# Patient Record
Sex: FEMALE | Race: WHITE | Employment: OTHER | URBAN - NONMETROPOLITAN AREA
[De-identification: names, ages, dates, MRNs, and addresses within clinical notes are randomized per-mention and may not be internally consistent; named-entity substitution may affect disease eponyms.]

---

## 2017-02-07 RX ORDER — IBRUTINIB 140 MG/1
TABLET, FILM COATED ORAL
Qty: 60 CAPSULE | Refills: 0 | Status: SHIPPED | OUTPATIENT
Start: 2017-02-07 | End: 2017-03-14 | Stop reason: SDUPTHER

## 2017-02-09 ENCOUNTER — OFFICE VISIT (OUTPATIENT)
Dept: ONCOLOGY | Age: 67
End: 2017-02-09

## 2017-02-09 VITALS
TEMPERATURE: 97.3 F | HEART RATE: 70 BPM | OXYGEN SATURATION: 97 % | BODY MASS INDEX: 34.58 KG/M2 | SYSTOLIC BLOOD PRESSURE: 119 MMHG | DIASTOLIC BLOOD PRESSURE: 57 MMHG | WEIGHT: 215.2 LBS | RESPIRATION RATE: 18 BRPM | HEIGHT: 66 IN

## 2017-02-09 DIAGNOSIS — C91.10 CLL (CHRONIC LYMPHOCYTIC LEUKEMIA) (HCC): Primary | ICD-10-CM

## 2017-02-09 PROCEDURE — 99215 OFFICE O/P EST HI 40 MIN: CPT | Performed by: INTERNAL MEDICINE

## 2017-02-13 ENCOUNTER — TELEPHONE (OUTPATIENT)
Dept: ONCOLOGY | Age: 67
End: 2017-02-13

## 2017-03-14 RX ORDER — IBRUTINIB 140 MG/1
TABLET, FILM COATED ORAL
Qty: 60 CAPSULE | Refills: 0 | Status: SHIPPED | OUTPATIENT
Start: 2017-03-14 | End: 2017-04-13 | Stop reason: SDUPTHER

## 2017-04-17 RX ORDER — IBRUTINIB 140 MG/1
TABLET, FILM COATED ORAL
Qty: 60 CAPSULE | Refills: 0 | Status: SHIPPED | OUTPATIENT
Start: 2017-04-17 | End: 2017-09-01 | Stop reason: ALTCHOICE

## 2017-08-01 ENCOUNTER — OFFICE VISIT (OUTPATIENT)
Dept: SURGERY | Age: 67
End: 2017-08-01
Payer: MEDICARE

## 2017-08-01 VITALS
HEIGHT: 66 IN | SYSTOLIC BLOOD PRESSURE: 120 MMHG | HEART RATE: 77 BPM | WEIGHT: 213.8 LBS | BODY MASS INDEX: 34.36 KG/M2 | TEMPERATURE: 96.6 F | DIASTOLIC BLOOD PRESSURE: 70 MMHG | OXYGEN SATURATION: 94 % | RESPIRATION RATE: 18 BRPM

## 2017-08-01 DIAGNOSIS — I10 ESSENTIAL HYPERTENSION: ICD-10-CM

## 2017-08-01 DIAGNOSIS — R59.1 LYMPHADENOPATHY: Primary | ICD-10-CM

## 2017-08-01 DIAGNOSIS — Z01.818 PRE-OP TESTING: ICD-10-CM

## 2017-08-01 PROCEDURE — 99203 OFFICE O/P NEW LOW 30 MIN: CPT | Performed by: SURGERY

## 2017-08-01 RX ORDER — LISINOPRIL AND HYDROCHLOROTHIAZIDE 25; 20 MG/1; MG/1
1 TABLET ORAL DAILY
COMMUNITY

## 2017-08-01 ASSESSMENT — ENCOUNTER SYMPTOMS
SORE THROAT: 0
EYE REDNESS: 0
PHOTOPHOBIA: 0
COLOR CHANGE: 0
BLOOD IN STOOL: 0
RHINORRHEA: 0
TROUBLE SWALLOWING: 0
WHEEZING: 0
RECTAL PAIN: 0
CHOKING: 0
VOMITING: 0
CHEST TIGHTNESS: 0
SHORTNESS OF BREATH: 0
EYE DISCHARGE: 0
BACK PAIN: 0
COUGH: 0
DIARRHEA: 0
ANAL BLEEDING: 0
ABDOMINAL PAIN: 0
CONSTIPATION: 0
EYE PAIN: 0
APNEA: 0
ABDOMINAL DISTENTION: 0
SINUS PRESSURE: 0
NAUSEA: 0
STRIDOR: 0
FACIAL SWELLING: 0
EYE ITCHING: 0
VOICE CHANGE: 0

## 2017-08-01 NOTE — PROGRESS NOTES
Subjective:      Patient ID: Wili Rene is a 69 y.o. female. Chief Complaint   Patient presents with    Surgical Consult     New patient-referred by Dr Pepe Ocampo posterior cervical lymph node biopsy       HPI 26-year-old white female who I seen in the past performing a lumpectomy for breast cancer also has been diagnosed with lymphoma as developed some recurring left supraclavicular and cervical lymphadenopathy she's been referred for possible excision for biopsy purposes to determine the level of treatment needed she states the nodules of got larger and are causing her increased discomfort she denies any infection or evidence of cellulitis    Review of Systems   Constitutional: Positive for fatigue. Negative for activity change, appetite change, chills, diaphoresis, fever and unexpected weight change. HENT: Negative for congestion, dental problem, drooling, ear discharge, ear pain, facial swelling, hearing loss, mouth sores, nosebleeds, postnasal drip, rhinorrhea, sinus pressure, sneezing, sore throat, tinnitus, trouble swallowing and voice change. Eyes: Negative for photophobia, pain, discharge, redness, itching and visual disturbance. Respiratory: Negative for apnea, cough, choking, chest tightness, shortness of breath, wheezing and stridor. Cardiovascular: Negative for chest pain, palpitations and leg swelling. Gastrointestinal: Negative for abdominal distention, abdominal pain, anal bleeding, blood in stool, constipation, diarrhea, nausea, rectal pain and vomiting. Endocrine: Negative for cold intolerance, heat intolerance, polydipsia, polyphagia and polyuria. Genitourinary: Negative for decreased urine volume, difficulty urinating, dyspareunia, dysuria, enuresis, flank pain, frequency, genital sores, hematuria, menstrual problem, pelvic pain, urgency, vaginal bleeding, vaginal discharge and vaginal pain.    Musculoskeletal: Negative for arthralgias, back pain, gait problem, joint on file     Social History Narrative     Family History   Problem Relation Age of Onset    Heart Disease Father      congestive heart failure     Allergies   Allergen Reactions    Zithromax [Azithromycin]      hives       Current Outpatient Prescriptions:     lisinopril-hydrochlorothiazide (PRINZIDE;ZESTORETIC) 20-25 MG per tablet, Take 1 tablet by mouth daily, Disp: , Rfl:     IMBRUVICA 140 MG chemo capsule, TAKE 2 CAPSULES BY MOUTH DAILY , Disp: 60 capsule, Rfl: 0    citalopram (CELEXA) 20 MG tablet, Take 20 mg by mouth daily. , Disp: , Rfl:     Vitamin D (CHOLECALCIFEROL) 1000 UNITS CAPS capsule, Take 1,000 Units by mouth daily Takes Vitamin D in the winter months, Disp: , Rfl:     Levothyroxine Sodium (SYNTHROID PO), Take 100 mcg by mouth daily , Disp: , Rfl:     pravastatin (PRAVACHOL) 40 MG tablet, Take 40 mg by mouth daily. , Disp: , Rfl:     Lansoprazole (PREVACID PO), Take  by mouth. OTC P.R.N. , Disp: , Rfl:     Objective:   Physical Exam   Constitutional: She is oriented to person, place, and time. She appears well-developed and well-nourished. HENT:   Head: Normocephalic and atraumatic. Eyes: Conjunctivae and EOM are normal. Pupils are equal, round, and reactive to light. Right eye exhibits no discharge. Left eye exhibits no discharge. No scleral icterus. Neck: Normal range of motion. Neck supple. No JVD present. No thyromegaly present. Cardiovascular: Normal rate and regular rhythm. Exam reveals no gallop and no friction rub. No murmur heard. Pulmonary/Chest: Effort normal and breath sounds normal. No stridor. No respiratory distress. She has no wheezes. She exhibits no tenderness. Abdominal: She exhibits no distension. There is no tenderness. There is no rebound. Musculoskeletal: Normal range of motion. Neurological: She is alert and oriented to person, place, and time. Skin: Skin is warm and dry. No rash noted. No erythema. No pallor.    Psychiatric: She has a normal mood and affect. Her behavior is normal. Judgment and thought content normal.       Assessment:      Cervical lymph nodes palpated in the anterior triangle patient has a history of breast cancer but these are unlikely related to those and has a history of lymphoma Dr. Nicholas Victoria would like to have excision of these lymph nodes for pathology purposes      Plan:      1. Schedule Briseyda Snyder for excision of left cervical lymph nodes  2. The risks, benefits and alternatives were discussed with Briseyda Snyder. She understands and wishes to proceed with surgical intervention. 3. Restrictions discussed with Moniquehardy Snyder and she expresses understanding. 4. She is advised to call back directly if there are further questions/concerns, or if her symptoms worsen prior to surgery.

## 2017-08-01 NOTE — MR AVS SNAPSHOT
After Visit Summary             Wili Rene   2017 12:00 PM   Office Visit    Description:  Female : 1950   Provider:  Harrietta Cheadle, MD   Department:  [facility]              Your Follow-Up and Future Appointments         Below is a list of your follow-up and future appointments. This may not be a complete list as you may have made appointments directly with providers that we are not aware of or your providers may have made some for you. Please call your providers to confirm appointments. It is important to keep your appointments. Please bring your current insurance card, photo ID, co-pay, and all medication bottles to your appointment. If self-pay, payment is expected at the time of service. Information from Your Visit        Department     Name Address Phone Fax    [facility] [address] [address] [address] [place] [phone]      You Were Seen for:         Comments    Lymphadenopathy   []         Vital Signs     Blood Pressure Pulse Temperature Respirations Height Weight    120/70 (Site: Left Arm, Position: Sitting, Cuff Size: Medium Adult) 77 96.6 °F (35.9 °C) (Tympanic) 18 5' 6.14\" (1.68 m) 213 lb 12.8 oz (97 kg)    Oxygen Saturation Body Mass Index Smoking Status             94% 34.36 kg/m2 Former Smoker         Additional Information about your Body Mass Index (BMI)           Your BMI as listed above is considered obese (30 or more). BMI is an estimate of body fat, calculated from your height and weight. The higher your BMI, the greater your risk of heart disease, high blood pressure, type 2 diabetes, stroke, gallstones, arthritis, sleep apnea, and certain cancers. BMI is not perfect. It may overestimate body fat in athletes and people who are more muscular.   Even a small weight loss (between 5 and 10 percent of your current weight) by decreasing your calorie intake and becoming more physically active will help lower your risk of developing or worsening diseases associated with obesity. Learn more at: [employer]             Medications and Orders      Your Current Medications Are              lisinopril-hydrochlorothiazide (PRINZIDE;ZESTORETIC) 20-25 MG per tablet Take 1 tablet by mouth daily    IMBRUVICA 140 MG chemo capsule TAKE 2 CAPSULES BY MOUTH DAILY     citalopram (CELEXA) 20 MG tablet Take 20 mg by mouth daily. Vitamin D (CHOLECALCIFEROL) 1000 UNITS CAPS capsule Take 1,000 Units by mouth daily Takes Vitamin D in the winter months    Levothyroxine Sodium (SYNTHROID PO) Take 100 mcg by mouth daily     pravastatin (PRAVACHOL) 40 MG tablet Take 40 mg by mouth daily. Lansoprazole (PREVACID PO) Take  by mouth. OTC P.R.N. Allergies              Zithromax [Azithromycin]     hives         Additional Information        Basic Information     Date Of Birth Sex Race Ethnicity Preferred Language    1950 Female White Non-/Non  English      Problem List as of 8/1/2017  Date Reviewed: 10/19/2015                Breast cancer, right breast [facility])    Chemotherapy management, encounter for    Leukocytosis    CLL (chronic lymphocytic leukemia) ([facility] [address]      Immunizations as of 8/1/2017     Name Date    Pneumococcal Polysaccharide (Agmonbbsx40) 11/14/2013      Preventive Care        Date Due    Hepatitis C screening is recommended for all adults regardless of risk factors born between [facility] at least once (lifetime) who have never been tested.  1950    Tetanus Combination Vaccine (1 - Tdap) 6/17/1967    Cholesterol Screening 6/17/1988    Diabetes Screening 6/17/1988    Colonoscopy 6/17/1998    Pneumococcal Vaccines (two) for age 72 years & over with: cerebrospinal fluid leaks, cochlear implants, hemoglobinopathies,  asplenia, immunodeficiencies, HIV infection, or chronic renal failure (2 of 2 -

## 2017-08-02 ENCOUNTER — TELEPHONE (OUTPATIENT)
Dept: SURGERY | Age: 67
End: 2017-08-02

## 2017-08-08 ENCOUNTER — TELEPHONE (OUTPATIENT)
Dept: SURGERY | Age: 67
End: 2017-08-08

## 2017-08-15 ENCOUNTER — ANESTHESIA (OUTPATIENT)
Dept: OPERATING ROOM | Age: 67
End: 2017-08-15
Payer: MEDICARE

## 2017-08-15 ENCOUNTER — ANESTHESIA EVENT (OUTPATIENT)
Dept: OPERATING ROOM | Age: 67
End: 2017-08-15
Payer: MEDICARE

## 2017-08-15 ENCOUNTER — HOSPITAL ENCOUNTER (OUTPATIENT)
Age: 67
Discharge: HOME | End: 2017-08-15
Payer: MEDICARE

## 2017-08-15 ENCOUNTER — HOSPITAL ENCOUNTER (OUTPATIENT)
Age: 67
Setting detail: OUTPATIENT SURGERY
Discharge: HOME | End: 2017-08-15
Attending: SURGERY | Admitting: SURGERY
Payer: MEDICARE

## 2017-08-15 VITALS
OXYGEN SATURATION: 100 % | RESPIRATION RATE: 4 BRPM | DIASTOLIC BLOOD PRESSURE: 64 MMHG | TEMPERATURE: 98.6 F | SYSTOLIC BLOOD PRESSURE: 114 MMHG

## 2017-08-15 VITALS
RESPIRATION RATE: 20 BRPM | TEMPERATURE: 97.6 F | HEART RATE: 70 BPM | WEIGHT: 211.2 LBS | OXYGEN SATURATION: 91 % | BODY MASS INDEX: 33.94 KG/M2 | SYSTOLIC BLOOD PRESSURE: 101 MMHG | HEIGHT: 66 IN | DIASTOLIC BLOOD PRESSURE: 61 MMHG

## 2017-08-15 LAB
ALBUMIN SERPL-MCNC: 3.7 G/DL (ref 3.5–5.1)
ALP BLD-CCNC: 93 U/L (ref 38–126)
ALT SERPL-CCNC: 30 U/L (ref 11–66)
ANION GAP SERPL CALCULATED.3IONS-SCNC: 12 MEQ/L (ref 8–16)
ANISOCYTOSIS: ABNORMAL
AST SERPL-CCNC: 23 U/L (ref 5–40)
BASOPHILS # BLD: 0.7 %
BASOPHILS ABSOLUTE: 0.1 THOU/MM3 (ref 0–0.1)
BILIRUB SERPL-MCNC: 0.7 MG/DL (ref 0.3–1.2)
BUN BLDV-MCNC: 9 MG/DL (ref 7–22)
CALCIUM SERPL-MCNC: 9.1 MG/DL (ref 8.5–10.5)
CHLORIDE BLD-SCNC: 101 MEQ/L (ref 98–111)
CO2: 28 MEQ/L (ref 23–33)
CREAT SERPL-MCNC: 0.8 MG/DL (ref 0.4–1.2)
EOSINOPHIL # BLD: 8.7 %
EOSINOPHILS ABSOLUTE: 1 THOU/MM3 (ref 0–0.4)
GFR SERPL CREATININE-BSD FRML MDRD: 71 ML/MIN/1.73M2
GLUCOSE BLD-MCNC: 114 MG/DL (ref 70–108)
HCT VFR BLD CALC: 38 % (ref 37–47)
HEMOGLOBIN: 12.2 GM/DL (ref 12–16)
HYPOCHROMIA: ABNORMAL
LD: 270 U/L (ref 100–190)
LYMPHOCYTES # BLD: 11.1 %
LYMPHOCYTES ABSOLUTE: 1.3 THOU/MM3 (ref 1–4.8)
MCH RBC QN AUTO: 25.4 PG (ref 27–31)
MCHC RBC AUTO-ENTMCNC: 32.2 GM/DL (ref 33–37)
MCV RBC AUTO: 78.8 FL (ref 81–99)
MICROCYTES: ABNORMAL
MONOCYTES # BLD: 8.2 %
MONOCYTES ABSOLUTE: 1 THOU/MM3 (ref 0.4–1.3)
NUCLEATED RED BLOOD CELLS: 0 /100 WBC
PDW BLD-RTO: 15.9 % (ref 11.5–14.5)
PLATELET # BLD: 184 THOU/MM3 (ref 130–400)
PMV BLD AUTO: 7.5 MCM (ref 7.4–10.4)
POTASSIUM SERPL-SCNC: 4.1 MEQ/L (ref 3.5–5.2)
POTASSIUM SERPL-SCNC: 4.3 MEQ/L (ref 3.5–5.2)
RBC # BLD: 4.83 MILL/MM3 (ref 4.2–5.4)
RBC # BLD: NORMAL 10*6/UL
SEG NEUTROPHILS: 71.3 %
SEGMENTED NEUTROPHILS ABSOLUTE COUNT: 8.3 THOU/MM3 (ref 1.8–7.7)
SODIUM BLD-SCNC: 141 MEQ/L (ref 135–145)
TOTAL PROTEIN: 6.1 G/DL (ref 6.1–8)
WBC # BLD: 11.6 THOU/MM3 (ref 4.8–10.8)

## 2017-08-15 PROCEDURE — 6360000002 HC RX W HCPCS: Performed by: ANESTHESIOLOGY

## 2017-08-15 PROCEDURE — 7100000000 HC PACU RECOVERY - FIRST 15 MIN: Performed by: SURGERY

## 2017-08-15 PROCEDURE — 88365 INSITU HYBRIDIZATION (FISH): CPT

## 2017-08-15 PROCEDURE — 88341 IMHCHEM/IMCYTCHM EA ADD ANTB: CPT

## 2017-08-15 PROCEDURE — 88305 TISSUE EXAM BY PATHOLOGIST: CPT

## 2017-08-15 PROCEDURE — 85025 COMPLETE CBC W/AUTO DIFF WBC: CPT

## 2017-08-15 PROCEDURE — 6360000002 HC RX W HCPCS: Performed by: NURSE ANESTHETIST, CERTIFIED REGISTERED

## 2017-08-15 PROCEDURE — 7100000010 HC PHASE II RECOVERY - FIRST 15 MIN: Performed by: SURGERY

## 2017-08-15 PROCEDURE — 84132 ASSAY OF SERUM POTASSIUM: CPT

## 2017-08-15 PROCEDURE — 88185 FLOWCYTOMETRY/TC ADD-ON: CPT

## 2017-08-15 PROCEDURE — 38500 BIOPSY/REMOVAL LYMPH NODES: CPT | Performed by: SURGERY

## 2017-08-15 PROCEDURE — 2780000010 HC IMPLANT OTHER: Performed by: SURGERY

## 2017-08-15 PROCEDURE — 88364 INSITU HYBRIDIZATION (FISH): CPT

## 2017-08-15 PROCEDURE — 7100000001 HC PACU RECOVERY - ADDTL 15 MIN: Performed by: SURGERY

## 2017-08-15 PROCEDURE — 88184 FLOWCYTOMETRY/ TC 1 MARKER: CPT

## 2017-08-15 PROCEDURE — 3700000000 HC ANESTHESIA ATTENDED CARE: Performed by: SURGERY

## 2017-08-15 PROCEDURE — 36415 COLL VENOUS BLD VENIPUNCTURE: CPT

## 2017-08-15 PROCEDURE — 83615 LACTATE (LD) (LDH) ENZYME: CPT

## 2017-08-15 PROCEDURE — 7100000011 HC PHASE II RECOVERY - ADDTL 15 MIN: Performed by: SURGERY

## 2017-08-15 PROCEDURE — 3600000012 HC SURGERY LEVEL 2 ADDTL 15MIN: Performed by: SURGERY

## 2017-08-15 PROCEDURE — 3700000001 HC ADD 15 MINUTES (ANESTHESIA): Performed by: SURGERY

## 2017-08-15 PROCEDURE — 2500000003 HC RX 250 WO HCPCS: Performed by: NURSE ANESTHETIST, CERTIFIED REGISTERED

## 2017-08-15 PROCEDURE — 2580000003 HC RX 258: Performed by: SURGERY

## 2017-08-15 PROCEDURE — 80053 COMPREHEN METABOLIC PANEL: CPT

## 2017-08-15 PROCEDURE — 88342 IMHCHEM/IMCYTCHM 1ST ANTB: CPT

## 2017-08-15 PROCEDURE — 3600000002 HC SURGERY LEVEL 2 BASE: Performed by: SURGERY

## 2017-08-15 PROCEDURE — 6360000002 HC RX W HCPCS: Performed by: SURGERY

## 2017-08-15 RX ORDER — MEPERIDINE HYDROCHLORIDE 25 MG/ML
12.5 INJECTION INTRAMUSCULAR; INTRAVENOUS; SUBCUTANEOUS EVERY 5 MIN PRN
Status: DISCONTINUED | OUTPATIENT
Start: 2017-08-15 | End: 2017-08-15 | Stop reason: HOSPADM

## 2017-08-15 RX ORDER — PROMETHAZINE HYDROCHLORIDE 25 MG/ML
12.5 INJECTION, SOLUTION INTRAMUSCULAR; INTRAVENOUS
Status: DISCONTINUED | OUTPATIENT
Start: 2017-08-15 | End: 2017-08-15 | Stop reason: HOSPADM

## 2017-08-15 RX ORDER — OXYCODONE HYDROCHLORIDE AND ACETAMINOPHEN 5; 325 MG/1; MG/1
1 TABLET ORAL EVERY 6 HOURS PRN
Status: DISCONTINUED | OUTPATIENT
Start: 2017-08-15 | End: 2017-08-15 | Stop reason: HOSPADM

## 2017-08-15 RX ORDER — DIPHENHYDRAMINE HYDROCHLORIDE 50 MG/ML
12.5 INJECTION INTRAMUSCULAR; INTRAVENOUS
Status: DISCONTINUED | OUTPATIENT
Start: 2017-08-15 | End: 2017-08-15 | Stop reason: HOSPADM

## 2017-08-15 RX ORDER — DEXAMETHASONE SODIUM PHOSPHATE 4 MG/ML
INJECTION, SOLUTION INTRA-ARTICULAR; INTRALESIONAL; INTRAMUSCULAR; INTRAVENOUS; SOFT TISSUE PRN
Status: DISCONTINUED | OUTPATIENT
Start: 2017-08-15 | End: 2017-08-15 | Stop reason: SDUPTHER

## 2017-08-15 RX ORDER — FENTANYL CITRATE 50 UG/ML
25 INJECTION, SOLUTION INTRAMUSCULAR; INTRAVENOUS EVERY 5 MIN PRN
Status: DISCONTINUED | OUTPATIENT
Start: 2017-08-15 | End: 2017-08-15 | Stop reason: HOSPADM

## 2017-08-15 RX ORDER — LABETALOL HYDROCHLORIDE 5 MG/ML
5 INJECTION, SOLUTION INTRAVENOUS EVERY 10 MIN PRN
Status: DISCONTINUED | OUTPATIENT
Start: 2017-08-15 | End: 2017-08-15 | Stop reason: HOSPADM

## 2017-08-15 RX ORDER — PROPOFOL 10 MG/ML
INJECTION, EMULSION INTRAVENOUS PRN
Status: DISCONTINUED | OUTPATIENT
Start: 2017-08-15 | End: 2017-08-15 | Stop reason: SDUPTHER

## 2017-08-15 RX ORDER — LIDOCAINE HYDROCHLORIDE 20 MG/ML
INJECTION, SOLUTION INFILTRATION; PERINEURAL PRN
Status: DISCONTINUED | OUTPATIENT
Start: 2017-08-15 | End: 2017-08-15 | Stop reason: SDUPTHER

## 2017-08-15 RX ORDER — OXYCODONE HYDROCHLORIDE AND ACETAMINOPHEN 5; 325 MG/1; MG/1
1 TABLET ORAL EVERY 6 HOURS PRN
Qty: 20 TABLET | Refills: 0 | Status: SHIPPED | OUTPATIENT
Start: 2017-08-15 | End: 2017-08-24

## 2017-08-15 RX ORDER — SODIUM CHLORIDE 9 MG/ML
INJECTION, SOLUTION INTRAVENOUS CONTINUOUS
Status: DISCONTINUED | OUTPATIENT
Start: 2017-08-15 | End: 2017-08-15 | Stop reason: HOSPADM

## 2017-08-15 RX ORDER — ONDANSETRON 2 MG/ML
INJECTION INTRAMUSCULAR; INTRAVENOUS PRN
Status: DISCONTINUED | OUTPATIENT
Start: 2017-08-15 | End: 2017-08-15 | Stop reason: SDUPTHER

## 2017-08-15 RX ORDER — FENTANYL CITRATE 50 UG/ML
INJECTION, SOLUTION INTRAMUSCULAR; INTRAVENOUS PRN
Status: DISCONTINUED | OUTPATIENT
Start: 2017-08-15 | End: 2017-08-15 | Stop reason: SDUPTHER

## 2017-08-15 RX ORDER — MIDAZOLAM HYDROCHLORIDE 1 MG/ML
INJECTION INTRAMUSCULAR; INTRAVENOUS PRN
Status: DISCONTINUED | OUTPATIENT
Start: 2017-08-15 | End: 2017-08-15 | Stop reason: SDUPTHER

## 2017-08-15 RX ORDER — FENTANYL CITRATE 50 UG/ML
50 INJECTION, SOLUTION INTRAMUSCULAR; INTRAVENOUS EVERY 5 MIN PRN
Status: DISCONTINUED | OUTPATIENT
Start: 2017-08-15 | End: 2017-08-15 | Stop reason: HOSPADM

## 2017-08-15 RX ORDER — GLYCOPYRROLATE 0.2 MG/ML
INJECTION INTRAMUSCULAR; INTRAVENOUS PRN
Status: DISCONTINUED | OUTPATIENT
Start: 2017-08-15 | End: 2017-08-15 | Stop reason: SDUPTHER

## 2017-08-15 RX ORDER — METOCLOPRAMIDE HYDROCHLORIDE 5 MG/ML
10 INJECTION INTRAMUSCULAR; INTRAVENOUS
Status: DISCONTINUED | OUTPATIENT
Start: 2017-08-15 | End: 2017-08-15 | Stop reason: HOSPADM

## 2017-08-15 RX ADMIN — FENTANYL CITRATE 25 MCG: 50 INJECTION INTRAMUSCULAR; INTRAVENOUS at 12:23

## 2017-08-15 RX ADMIN — FENTANYL CITRATE 50 MCG: 50 INJECTION INTRAMUSCULAR; INTRAVENOUS at 12:19

## 2017-08-15 RX ADMIN — PHENYLEPHRINE HYDROCHLORIDE 200 MCG: 10 INJECTION INTRAMUSCULAR; INTRAVENOUS; SUBCUTANEOUS at 12:51

## 2017-08-15 RX ADMIN — FENTANYL CITRATE 50 MCG: 50 INJECTION INTRAMUSCULAR; INTRAVENOUS at 12:34

## 2017-08-15 RX ADMIN — LIDOCAINE HYDROCHLORIDE 80 MG: 20 INJECTION, SOLUTION INFILTRATION; PERINEURAL at 12:18

## 2017-08-15 RX ADMIN — ONDANSETRON 4 MG: 2 INJECTION INTRAMUSCULAR; INTRAVENOUS at 12:58

## 2017-08-15 RX ADMIN — PHENYLEPHRINE HYDROCHLORIDE 200 MCG: 10 INJECTION INTRAMUSCULAR; INTRAVENOUS; SUBCUTANEOUS at 12:42

## 2017-08-15 RX ADMIN — PHENYLEPHRINE HYDROCHLORIDE 200 MCG: 10 INJECTION INTRAMUSCULAR; INTRAVENOUS; SUBCUTANEOUS at 12:21

## 2017-08-15 RX ADMIN — DEXAMETHASONE SODIUM PHOSPHATE 8 MG: 4 INJECTION, SOLUTION INTRAMUSCULAR; INTRAVENOUS at 12:19

## 2017-08-15 RX ADMIN — GLYCOPYRROLATE 0.1 MG: 0.2 INJECTION, SOLUTION INTRAMUSCULAR; INTRAVENOUS at 12:28

## 2017-08-15 RX ADMIN — FENTANYL CITRATE 25 MCG: 50 INJECTION INTRAMUSCULAR; INTRAVENOUS at 13:24

## 2017-08-15 RX ADMIN — PHENYLEPHRINE HYDROCHLORIDE 100 MCG: 10 INJECTION INTRAMUSCULAR; INTRAVENOUS; SUBCUTANEOUS at 12:35

## 2017-08-15 RX ADMIN — PROPOFOL 200 MG: 10 INJECTION, EMULSION INTRAVENOUS at 12:11

## 2017-08-15 RX ADMIN — MIDAZOLAM HYDROCHLORIDE 2 MG: 1 INJECTION INTRAMUSCULAR; INTRAVENOUS at 12:08

## 2017-08-15 RX ADMIN — FENTANYL CITRATE 25 MCG: 50 INJECTION INTRAMUSCULAR; INTRAVENOUS at 13:16

## 2017-08-15 RX ADMIN — SODIUM CHLORIDE: 9 INJECTION, SOLUTION INTRAVENOUS at 11:43

## 2017-08-15 RX ADMIN — CEFAZOLIN SODIUM 1 G: 1 INJECTION, SOLUTION INTRAVENOUS at 12:07

## 2017-08-15 RX ADMIN — PHENYLEPHRINE HYDROCHLORIDE 100 MCG: 10 INJECTION INTRAMUSCULAR; INTRAVENOUS; SUBCUTANEOUS at 12:28

## 2017-08-15 RX ADMIN — GLYCOPYRROLATE 0.1 MG: 0.2 INJECTION, SOLUTION INTRAMUSCULAR; INTRAVENOUS at 12:41

## 2017-08-15 ASSESSMENT — PULMONARY FUNCTION TESTS
PIF_VALUE: 3
PIF_VALUE: 4
PIF_VALUE: 3
PIF_VALUE: 4
PIF_VALUE: 3
PIF_VALUE: 3
PIF_VALUE: 2
PIF_VALUE: 3
PIF_VALUE: 1
PIF_VALUE: 4
PIF_VALUE: 13
PIF_VALUE: 2
PIF_VALUE: 3
PIF_VALUE: 3
PIF_VALUE: 4
PIF_VALUE: 17
PIF_VALUE: 3
PIF_VALUE: 4
PIF_VALUE: 3
PIF_VALUE: 22
PIF_VALUE: 4
PIF_VALUE: 3
PIF_VALUE: 0
PIF_VALUE: 4
PIF_VALUE: 3
PIF_VALUE: 4
PIF_VALUE: 0
PIF_VALUE: 4
PIF_VALUE: 3
PIF_VALUE: 0
PIF_VALUE: 14
PIF_VALUE: 4
PIF_VALUE: 2
PIF_VALUE: 1
PIF_VALUE: 4
PIF_VALUE: 18
PIF_VALUE: 3
PIF_VALUE: 3
PIF_VALUE: 0
PIF_VALUE: 3
PIF_VALUE: 3
PIF_VALUE: 4
PIF_VALUE: 3
PIF_VALUE: 4
PIF_VALUE: 3
PIF_VALUE: 4
PIF_VALUE: 3
PIF_VALUE: 9
PIF_VALUE: 3
PIF_VALUE: 1
PIF_VALUE: 4
PIF_VALUE: 3
PIF_VALUE: 3
PIF_VALUE: 4
PIF_VALUE: 3

## 2017-08-15 ASSESSMENT — PAIN DESCRIPTION - LOCATION: LOCATION: NECK

## 2017-08-15 ASSESSMENT — PAIN SCALES - GENERAL
PAINLEVEL_OUTOF10: 6
PAINLEVEL_OUTOF10: 6
PAINLEVEL_OUTOF10: 0
PAINLEVEL_OUTOF10: 5

## 2017-08-15 ASSESSMENT — PAIN - FUNCTIONAL ASSESSMENT: PAIN_FUNCTIONAL_ASSESSMENT: 0-10

## 2017-08-15 ASSESSMENT — PAIN DESCRIPTION - PAIN TYPE: TYPE: SURGICAL PAIN

## 2017-08-15 ASSESSMENT — PAIN DESCRIPTION - ORIENTATION: ORIENTATION: LEFT

## 2017-08-16 ENCOUNTER — TELEPHONE (OUTPATIENT)
Dept: SURGERY | Age: 67
End: 2017-08-16

## 2017-08-17 LAB — LEUKEMIA/LYMPHOMA PHENOTYPING MISC: NORMAL

## 2017-08-24 ENCOUNTER — OFFICE VISIT (OUTPATIENT)
Dept: SURGERY | Age: 67
End: 2017-08-24

## 2017-08-24 VITALS
TEMPERATURE: 97.3 F | DIASTOLIC BLOOD PRESSURE: 74 MMHG | WEIGHT: 211.3 LBS | SYSTOLIC BLOOD PRESSURE: 126 MMHG | RESPIRATION RATE: 16 BRPM | HEIGHT: 66 IN | OXYGEN SATURATION: 94 % | BODY MASS INDEX: 33.96 KG/M2 | HEART RATE: 74 BPM

## 2017-08-24 DIAGNOSIS — Z51.89 VISIT FOR WOUND CHECK: Primary | ICD-10-CM

## 2017-08-24 DIAGNOSIS — Z48.02 VISIT FOR SUTURE REMOVAL: ICD-10-CM

## 2017-08-24 PROCEDURE — 99024 POSTOP FOLLOW-UP VISIT: CPT | Performed by: NURSE PRACTITIONER

## 2017-08-24 ASSESSMENT — ENCOUNTER SYMPTOMS
COUGH: 0
CONSTIPATION: 0
COLOR CHANGE: 0
EYE ITCHING: 0
PHOTOPHOBIA: 0
WHEEZING: 0
CHOKING: 0
SHORTNESS OF BREATH: 0
BLOOD IN STOOL: 0
EYE DISCHARGE: 0
APNEA: 0
STRIDOR: 0
RECTAL PAIN: 0
VOMITING: 0
ABDOMINAL DISTENTION: 0
SINUS PRESSURE: 0
ANAL BLEEDING: 0
SORE THROAT: 0
ABDOMINAL PAIN: 0
RHINORRHEA: 0
VOICE CHANGE: 0
NAUSEA: 0
TROUBLE SWALLOWING: 0
DIARRHEA: 0
EYE REDNESS: 0
EYE PAIN: 0
CHEST TIGHTNESS: 0
FACIAL SWELLING: 0
BACK PAIN: 0

## 2017-08-31 ENCOUNTER — HOSPITAL ENCOUNTER (OUTPATIENT)
Dept: PET IMAGING | Age: 67
Discharge: HOME | End: 2017-08-31
Payer: MEDICARE

## 2017-08-31 ENCOUNTER — HOSPITAL ENCOUNTER (OUTPATIENT)
Dept: NON INVASIVE DIAGNOSTICS | Age: 67
Discharge: HOME | End: 2017-08-31
Payer: MEDICARE

## 2017-08-31 DIAGNOSIS — C50.911 MALIGNANT NEOPLASM OF RIGHT FEMALE BREAST, UNSPECIFIED SITE OF BREAST: ICD-10-CM

## 2017-08-31 DIAGNOSIS — C91.10 CHRONIC LYMPHOCYTIC LEUKEMIA B-CELL TYPE NOT HAVING ACHIEVED REMISSION (HCC): ICD-10-CM

## 2017-08-31 LAB
LV EF: 55 %
LVEF MODALITY: NORMAL

## 2017-08-31 PROCEDURE — 93306 TTE W/DOPPLER COMPLETE: CPT

## 2017-08-31 PROCEDURE — 78815 PET IMAGE W/CT SKULL-THIGH: CPT

## 2017-08-31 PROCEDURE — A9552 F18 FDG: HCPCS | Performed by: INTERNAL MEDICINE

## 2017-08-31 PROCEDURE — 3430000000 HC RX DIAGNOSTIC RADIOPHARMACEUTICAL: Performed by: INTERNAL MEDICINE

## 2017-08-31 RX ORDER — FLUDEOXYGLUCOSE F 18 200 MCI/ML
11.8 INJECTION, SOLUTION INTRAVENOUS
Status: COMPLETED | OUTPATIENT
Start: 2017-08-31 | End: 2017-08-31

## 2017-08-31 RX ADMIN — FLUDEOXYGLUCOSE F 18 11.8 MILLICURIE: 200 INJECTION, SOLUTION INTRAVENOUS at 10:27

## 2017-09-01 ENCOUNTER — HOSPITAL ENCOUNTER (OUTPATIENT)
Dept: INTERVENTIONAL RADIOLOGY/VASCULAR | Age: 67
Discharge: HOME | End: 2017-09-01
Payer: MEDICARE

## 2017-09-01 VITALS
TEMPERATURE: 97.9 F | SYSTOLIC BLOOD PRESSURE: 105 MMHG | DIASTOLIC BLOOD PRESSURE: 63 MMHG | OXYGEN SATURATION: 97 % | BODY MASS INDEX: 34.38 KG/M2 | RESPIRATION RATE: 20 BRPM | WEIGHT: 213 LBS | HEART RATE: 75 BPM

## 2017-09-01 LAB
HCT VFR BLD CALC: 34.3 % (ref 37–47)
HEMOGLOBIN: 11.3 GM/DL (ref 12–16)
MCH RBC QN AUTO: 25.7 PG (ref 27–31)
MCHC RBC AUTO-ENTMCNC: 33 GM/DL (ref 33–37)
MCV RBC AUTO: 77.8 FL (ref 81–99)
PDW BLD-RTO: 15.9 % (ref 11.5–14.5)
PLATELET # BLD: 237 THOU/MM3 (ref 130–400)
PMV BLD AUTO: 7.1 MCM (ref 7.4–10.4)
RBC # BLD: 4.41 MILL/MM3 (ref 4.2–5.4)
WBC # BLD: 11 THOU/MM3 (ref 4.8–10.8)

## 2017-09-01 PROCEDURE — 6370000000 HC RX 637 (ALT 250 FOR IP)

## 2017-09-01 PROCEDURE — 85027 COMPLETE CBC AUTOMATED: CPT

## 2017-09-01 PROCEDURE — C1788 PORT, INDWELLING, IMP: HCPCS

## 2017-09-01 PROCEDURE — 2580000003 HC RX 258: Performed by: RADIOLOGY

## 2017-09-01 PROCEDURE — 76937 US GUIDE VASCULAR ACCESS: CPT

## 2017-09-01 PROCEDURE — 2500000003 HC RX 250 WO HCPCS: Performed by: RADIOLOGY

## 2017-09-01 PROCEDURE — A6257 TRANSPARENT FILM <= 16 SQ IN: HCPCS

## 2017-09-01 PROCEDURE — 36561 INSERT TUNNELED CV CATH: CPT

## 2017-09-01 PROCEDURE — 2500000003 HC RX 250 WO HCPCS

## 2017-09-01 PROCEDURE — 36415 COLL VENOUS BLD VENIPUNCTURE: CPT

## 2017-09-01 PROCEDURE — A6258 TRANSPARENT FILM >16<=48 IN: HCPCS

## 2017-09-01 PROCEDURE — 6360000002 HC RX W HCPCS

## 2017-09-01 PROCEDURE — C1894 INTRO/SHEATH, NON-LASER: HCPCS

## 2017-09-01 PROCEDURE — 77001 FLUOROGUIDE FOR VEIN DEVICE: CPT

## 2017-09-01 RX ORDER — HEPARIN SODIUM (PORCINE) LOCK FLUSH IV SOLN 100 UNIT/ML 100 UNIT/ML
300 SOLUTION INTRAVENOUS ONCE
Status: COMPLETED | OUTPATIENT
Start: 2017-09-01 | End: 2017-09-01

## 2017-09-01 RX ORDER — FENTANYL CITRATE 50 UG/ML
50 INJECTION, SOLUTION INTRAMUSCULAR; INTRAVENOUS ONCE
Status: COMPLETED | OUTPATIENT
Start: 2017-09-01 | End: 2017-09-01

## 2017-09-01 RX ORDER — MIDAZOLAM HYDROCHLORIDE 1 MG/ML
1 INJECTION INTRAMUSCULAR; INTRAVENOUS ONCE
Status: COMPLETED | OUTPATIENT
Start: 2017-09-01 | End: 2017-09-01

## 2017-09-01 RX ORDER — SODIUM CHLORIDE 450 MG/100ML
INJECTION, SOLUTION INTRAVENOUS CONTINUOUS
Status: DISCONTINUED | OUTPATIENT
Start: 2017-09-01 | End: 2017-09-02 | Stop reason: HOSPADM

## 2017-09-01 RX ORDER — CLINDAMYCIN PHOSPHATE 600 MG/50ML
600 INJECTION INTRAVENOUS
Status: COMPLETED | OUTPATIENT
Start: 2017-09-01 | End: 2017-09-01

## 2017-09-01 RX ADMIN — SODIUM CHLORIDE: 4.5 INJECTION, SOLUTION INTRAVENOUS at 10:11

## 2017-09-01 RX ADMIN — CLINDAMYCIN PHOSPHATE 600 MG: 12 INJECTION, SOLUTION INTRAMUSCULAR; INTRAVENOUS at 10:12

## 2017-09-01 RX ADMIN — MIDAZOLAM HYDROCHLORIDE 1 MG: 1 INJECTION INTRAMUSCULAR; INTRAVENOUS at 11:06

## 2017-09-01 RX ADMIN — MIDAZOLAM HYDROCHLORIDE 1 MG: 1 INJECTION INTRAMUSCULAR; INTRAVENOUS at 11:11

## 2017-09-01 RX ADMIN — FENTANYL CITRATE 50 MCG: 50 INJECTION, SOLUTION INTRAMUSCULAR; INTRAVENOUS at 11:06

## 2017-09-01 RX ADMIN — SODIUM CHLORIDE 50000 UNITS: 0.9 IRRIGANT IRRIGATION at 11:27

## 2017-09-01 RX ADMIN — HEPARIN SODIUM (PORCINE) LOCK FLUSH IV SOLN 100 UNIT/ML 300 UNITS: 100 SOLUTION at 11:26

## 2017-09-01 RX ADMIN — FENTANYL CITRATE 50 MCG: 50 INJECTION, SOLUTION INTRAMUSCULAR; INTRAVENOUS at 11:11

## 2017-09-01 ASSESSMENT — PAIN - FUNCTIONAL ASSESSMENT: PAIN_FUNCTIONAL_ASSESSMENT: 0-10

## 2017-09-01 ASSESSMENT — PAIN SCALES - GENERAL
PAINLEVEL_OUTOF10: 0

## 2017-10-16 ENCOUNTER — HOSPITAL ENCOUNTER (OUTPATIENT)
Dept: CT IMAGING | Age: 67
Discharge: HOME | End: 2017-10-16
Payer: MEDICARE

## 2017-10-16 DIAGNOSIS — C85.11 B-CELL LYMPHOMA OF LYMPH NODES OF NECK, UNSPECIFIED B-CELL LYMPHOMA TYPE (HCC): ICD-10-CM

## 2017-10-16 PROCEDURE — 71260 CT THORAX DX C+: CPT

## 2017-10-16 PROCEDURE — 6360000002 HC RX W HCPCS

## 2017-10-16 PROCEDURE — 70491 CT SOFT TISSUE NECK W/DYE: CPT

## 2017-10-16 PROCEDURE — 74177 CT ABD & PELVIS W/CONTRAST: CPT

## 2017-10-16 PROCEDURE — 6360000004 HC RX CONTRAST MEDICATION: Performed by: INTERNAL MEDICINE

## 2017-10-16 RX ORDER — HEPARIN SODIUM (PORCINE) LOCK FLUSH IV SOLN 100 UNIT/ML 100 UNIT/ML
500 SOLUTION INTRAVENOUS ONCE
Status: COMPLETED | OUTPATIENT
Start: 2017-10-16 | End: 2017-10-16

## 2017-10-16 RX ADMIN — IOHEXOL 50 ML: 240 INJECTION, SOLUTION INTRATHECAL; INTRAVASCULAR; INTRAVENOUS; ORAL at 09:39

## 2017-10-16 RX ADMIN — HEPARIN SODIUM (PORCINE) LOCK FLUSH IV SOLN 100 UNIT/ML 500 UNITS: 100 SOLUTION at 09:56

## 2017-10-16 RX ADMIN — IOPAMIDOL 85 ML: 755 INJECTION, SOLUTION INTRAVENOUS at 09:38

## 2018-01-22 ENCOUNTER — HOSPITAL ENCOUNTER (OUTPATIENT)
Dept: PET IMAGING | Age: 68
Discharge: HOME | End: 2018-01-22
Payer: MEDICARE

## 2018-01-22 DIAGNOSIS — C50.911 MALIGNANT NEOPLASM OF RIGHT FEMALE BREAST, UNSPECIFIED ESTROGEN RECEPTOR STATUS, UNSPECIFIED SITE OF BREAST (HCC): ICD-10-CM

## 2018-01-22 DIAGNOSIS — C83.31 DIFFUSE LARGE B-CELL LYMPHOMA, LYMPH NODES OF HEAD, FACE, AND NECK (HCC): ICD-10-CM

## 2018-01-22 DIAGNOSIS — C91.10 CHRONIC LYMPHOCYTIC LEUKEMIA OF B-CELL TYPE NOT HAVING ACHIEVED REMISSION (HCC): ICD-10-CM

## 2018-01-22 PROCEDURE — 3430000000 HC RX DIAGNOSTIC RADIOPHARMACEUTICAL: Performed by: INTERNAL MEDICINE

## 2018-01-22 PROCEDURE — 78815 PET IMAGE W/CT SKULL-THIGH: CPT

## 2018-01-22 PROCEDURE — A9552 F18 FDG: HCPCS | Performed by: INTERNAL MEDICINE

## 2018-01-22 RX ORDER — FLUDEOXYGLUCOSE F 18 200 MCI/ML
10 INJECTION, SOLUTION INTRAVENOUS
Status: COMPLETED | OUTPATIENT
Start: 2018-01-22 | End: 2018-01-22

## 2018-01-22 RX ADMIN — FLUDEOXYGLUCOSE F 18 12.2 MILLICURIE: 200 INJECTION, SOLUTION INTRAVENOUS at 10:43

## 2018-01-29 ENCOUNTER — HOSPITAL ENCOUNTER (OUTPATIENT)
Dept: ULTRASOUND IMAGING | Age: 68
Discharge: HOME | End: 2018-01-29
Payer: MEDICARE

## 2018-01-29 DIAGNOSIS — C50.911 MALIGNANT NEOPLASM OF RIGHT FEMALE BREAST, UNSPECIFIED ESTROGEN RECEPTOR STATUS, UNSPECIFIED SITE OF BREAST (HCC): ICD-10-CM

## 2018-01-29 DIAGNOSIS — R11.2 NAUSEA AND VOMITING, INTRACTABILITY OF VOMITING NOT SPECIFIED, UNSPECIFIED VOMITING TYPE: ICD-10-CM

## 2018-01-29 DIAGNOSIS — C83.31 DIFFUSE LARGE B-CELL LYMPHOMA, LYMPH NODES OF HEAD, FACE, AND NECK (HCC): ICD-10-CM

## 2018-01-29 DIAGNOSIS — C91.10 CHRONIC LYMPHOCYTIC LEUK OF B-CELL TYPE NOT ACHIEVE REMIS (HCC): ICD-10-CM

## 2018-01-29 PROCEDURE — 76536 US EXAM OF HEAD AND NECK: CPT

## 2018-03-08 ENCOUNTER — HOSPITAL ENCOUNTER (OUTPATIENT)
Dept: NUCLEAR MEDICINE | Age: 68
Discharge: HOME | End: 2018-03-08
Payer: MEDICARE

## 2018-03-14 ENCOUNTER — APPOINTMENT (OUTPATIENT)
Dept: NUCLEAR MEDICINE | Age: 68
End: 2018-03-14
Payer: MEDICARE

## 2018-04-05 ENCOUNTER — HOSPITAL ENCOUNTER (OUTPATIENT)
Dept: NUCLEAR MEDICINE | Age: 68
Discharge: HOME | End: 2018-04-05
Payer: MEDICARE

## 2018-04-05 DIAGNOSIS — C50.011 MALIGNANT NEOPLASM OF NIPPLE OF RIGHT BREAST IN FEMALE, UNSPECIFIED ESTROGEN RECEPTOR STATUS (HCC): ICD-10-CM

## 2018-04-05 DIAGNOSIS — C91.10 CHRONIC LYMPHOCYTIC LEUKEMIA B-CELL TYPE NOT HAVING ACHIEVED REMISSION (HCC): ICD-10-CM

## 2018-04-05 DIAGNOSIS — C83.31 DIFFUSE LARGE B-CELL LYMPHOMA, LYMPH NODES OF HEAD, FACE, AND NECK (HCC): ICD-10-CM

## 2018-04-05 DIAGNOSIS — R93.89 ABNORMAL THYROID ULTRASOUND: ICD-10-CM

## 2018-04-05 DIAGNOSIS — R11.2 NAUSEA AND VOMITING, INTRACTABILITY OF VOMITING NOT SPECIFIED, UNSPECIFIED VOMITING TYPE: ICD-10-CM

## 2018-04-05 PROCEDURE — 3430000000 HC RX DIAGNOSTIC RADIOPHARMACEUTICAL: Performed by: INTERNAL MEDICINE

## 2018-04-05 PROCEDURE — A9516 IODINE I-123 SOD IODIDE MIC: HCPCS | Performed by: INTERNAL MEDICINE

## 2018-04-05 RX ADMIN — Medication 419 MICRO CURIE: at 08:20

## 2018-04-06 ENCOUNTER — HOSPITAL ENCOUNTER (OUTPATIENT)
Dept: NUCLEAR MEDICINE | Age: 68
Discharge: HOME | End: 2018-04-06
Payer: MEDICARE

## 2018-04-06 PROCEDURE — 78014 THYROID IMAGING W/BLOOD FLOW: CPT

## 2018-07-26 ENCOUNTER — HOSPITAL ENCOUNTER (OUTPATIENT)
Dept: CT IMAGING | Age: 68
Discharge: HOME | End: 2018-07-26
Payer: MEDICARE

## 2018-07-26 DIAGNOSIS — C91.10 CLL (CHRONIC LYMPHOCYTIC LEUKEMIA) (HCC): ICD-10-CM

## 2018-07-26 LAB — POC CREATININE WHOLE BLOOD: 0.7 MG/DL (ref 0.5–1.2)

## 2018-07-26 PROCEDURE — 6360000004 HC RX CONTRAST MEDICATION: Performed by: INTERNAL MEDICINE

## 2018-07-26 PROCEDURE — 74177 CT ABD & PELVIS W/CONTRAST: CPT

## 2018-07-26 PROCEDURE — 71260 CT THORAX DX C+: CPT

## 2018-07-26 PROCEDURE — 70491 CT SOFT TISSUE NECK W/DYE: CPT

## 2018-07-26 PROCEDURE — 82565 ASSAY OF CREATININE: CPT

## 2018-07-26 RX ADMIN — IOHEXOL 50 ML: 240 INJECTION, SOLUTION INTRATHECAL; INTRAVASCULAR; INTRAVENOUS; ORAL at 11:40

## 2018-07-26 RX ADMIN — IOPAMIDOL 85 ML: 755 INJECTION, SOLUTION INTRAVENOUS at 11:40

## 2019-01-28 ENCOUNTER — HOSPITAL ENCOUNTER (OUTPATIENT)
Dept: CT IMAGING | Age: 69
Discharge: HOME | End: 2019-01-28
Payer: MEDICARE

## 2019-01-28 DIAGNOSIS — C83.31 DIFFUSE LARGE B-CELL LYMPHOMA, LYMPH NODES OF HEAD, FACE, AND NECK (HCC): ICD-10-CM

## 2019-01-28 DIAGNOSIS — C91.10 CHRONIC LYMPHOCYTIC LEUKEMIA OF B-CELL TYPE NOT HAVING ACHIEVED REMISSION (HCC): ICD-10-CM

## 2019-01-28 PROCEDURE — 74177 CT ABD & PELVIS W/CONTRAST: CPT

## 2019-01-28 PROCEDURE — 71260 CT THORAX DX C+: CPT

## 2019-01-28 PROCEDURE — 6360000004 HC RX CONTRAST MEDICATION: Performed by: INTERNAL MEDICINE

## 2019-01-28 PROCEDURE — 70491 CT SOFT TISSUE NECK W/DYE: CPT

## 2019-01-28 RX ADMIN — IOHEXOL 50 ML: 240 INJECTION, SOLUTION INTRATHECAL; INTRAVASCULAR; INTRAVENOUS; ORAL at 09:48

## 2019-01-28 RX ADMIN — IOPAMIDOL 85 ML: 755 INJECTION, SOLUTION INTRAVENOUS at 09:48

## 2019-02-08 ENCOUNTER — HOSPITAL ENCOUNTER (OUTPATIENT)
Dept: WOMENS IMAGING | Age: 69
Discharge: HOME | End: 2019-02-08
Payer: MEDICARE

## 2019-02-08 DIAGNOSIS — Z78.0 POST-MENOPAUSAL: ICD-10-CM

## 2019-02-08 PROCEDURE — 77080 DXA BONE DENSITY AXIAL: CPT

## 2019-02-11 ENCOUNTER — HOSPITAL ENCOUNTER (OUTPATIENT)
Dept: PET IMAGING | Age: 69
Discharge: HOME | End: 2019-02-11
Payer: MEDICARE

## 2019-02-11 DIAGNOSIS — C50.911 MALIGNANT NEOPLASM OF RIGHT FEMALE BREAST, UNSPECIFIED ESTROGEN RECEPTOR STATUS, UNSPECIFIED SITE OF BREAST (HCC): ICD-10-CM

## 2019-02-11 DIAGNOSIS — C91.10 CHRONIC LYMPHOCYTIC LEUK OF B-CELL TYPE NOT ACHIEVE REMIS (HCC): ICD-10-CM

## 2019-02-11 DIAGNOSIS — C83.31 DIFFUSE LARGE B-CELL LYMPHOMA, LYMPH NODES OF HEAD, FACE, AND NECK (HCC): ICD-10-CM

## 2019-02-11 PROCEDURE — 78815 PET IMAGE W/CT SKULL-THIGH: CPT

## 2019-02-11 PROCEDURE — 3430000000 HC RX DIAGNOSTIC RADIOPHARMACEUTICAL: Performed by: INTERNAL MEDICINE

## 2019-02-11 PROCEDURE — A9552 F18 FDG: HCPCS | Performed by: INTERNAL MEDICINE

## 2019-02-11 RX ORDER — FLUDEOXYGLUCOSE F 18 200 MCI/ML
10 INJECTION, SOLUTION INTRAVENOUS
Status: COMPLETED | OUTPATIENT
Start: 2019-02-11 | End: 2019-02-11

## 2019-02-11 RX ADMIN — FLUDEOXYGLUCOSE F 18 13.7 MILLICURIE: 200 INJECTION, SOLUTION INTRAVENOUS at 10:17

## 2019-06-04 ENCOUNTER — HOSPITAL ENCOUNTER (OUTPATIENT)
Dept: CT IMAGING | Age: 69
Discharge: HOME | End: 2019-06-04
Payer: MEDICARE

## 2019-06-04 ENCOUNTER — HOSPITAL ENCOUNTER (OUTPATIENT)
Age: 69
Discharge: HOME | End: 2019-06-04
Payer: MEDICARE

## 2019-06-04 DIAGNOSIS — C91.10 ANEMIA ASSOC WITH CHRONIC LYMPHOCYTIC LEUKEMIA TXD WITH ERYTHROPOIETIN (HCC): ICD-10-CM

## 2019-06-04 DIAGNOSIS — D63.0 ANEMIA ASSOC WITH CHRONIC LYMPHOCYTIC LEUKEMIA TXD WITH ERYTHROPOIETIN (HCC): ICD-10-CM

## 2019-06-04 LAB
ALBUMIN SERPL-MCNC: 4.3 G/DL (ref 3.5–5.1)
ALP BLD-CCNC: 96 U/L (ref 38–126)
ALT SERPL-CCNC: 52 U/L (ref 11–66)
ANION GAP SERPL CALCULATED.3IONS-SCNC: 15 MEQ/L (ref 8–16)
AST SERPL-CCNC: 37 U/L (ref 5–40)
BASOPHILS # BLD: 0.3 %
BASOPHILS ABSOLUTE: 0 THOU/MM3 (ref 0–0.1)
BILIRUB SERPL-MCNC: 0.4 MG/DL (ref 0.3–1.2)
BUN BLDV-MCNC: 8 MG/DL (ref 7–22)
CALCIUM SERPL-MCNC: 9.3 MG/DL (ref 8.5–10.5)
CHLORIDE BLD-SCNC: 103 MEQ/L (ref 98–111)
CO2: 25 MEQ/L (ref 23–33)
CREAT SERPL-MCNC: 0.6 MG/DL (ref 0.4–1.2)
EOSINOPHIL # BLD: 5 %
EOSINOPHILS ABSOLUTE: 0.3 THOU/MM3 (ref 0–0.4)
ERYTHROCYTE [DISTWIDTH] IN BLOOD BY AUTOMATED COUNT: 14.8 % (ref 11.5–14.5)
ERYTHROCYTE [DISTWIDTH] IN BLOOD BY AUTOMATED COUNT: 45 FL (ref 35–45)
GFR SERPL CREATININE-BSD FRML MDRD: > 90 ML/MIN/1.73M2
GLUCOSE BLD-MCNC: 142 MG/DL (ref 70–108)
HCT VFR BLD CALC: 39.8 % (ref 37–47)
HEMOGLOBIN: 13.5 GM/DL (ref 12–16)
IMMATURE GRANS (ABS): 0.04 THOU/MM3 (ref 0–0.07)
IMMATURE GRANULOCYTES: 0.6 %
LD: 207 U/L (ref 100–190)
LYMPHOCYTES # BLD: 16.2 %
LYMPHOCYTES ABSOLUTE: 1.1 THOU/MM3 (ref 1–4.8)
MCH RBC QN AUTO: 28.5 PG (ref 26–33)
MCHC RBC AUTO-ENTMCNC: 33.9 GM/DL (ref 32.2–35.5)
MCV RBC AUTO: 84.1 FL (ref 81–99)
MONOCYTES # BLD: 10 %
MONOCYTES ABSOLUTE: 0.7 THOU/MM3 (ref 0.4–1.3)
NUCLEATED RED BLOOD CELLS: 0 /100 WBC
PLATELET # BLD: 181 THOU/MM3 (ref 130–400)
PMV BLD AUTO: 9 FL (ref 9.4–12.4)
POC CREATININE WHOLE BLOOD: 0.6 MG/DL (ref 0.5–1.2)
POTASSIUM SERPL-SCNC: 3.6 MEQ/L (ref 3.5–5.2)
RBC # BLD: 4.73 MILL/MM3 (ref 4.2–5.4)
SEDIMENTATION RATE, ERYTHROCYTE: 8 MM/HR (ref 0–20)
SEG NEUTROPHILS: 67.9 %
SEGMENTED NEUTROPHILS ABSOLUTE COUNT: 4.6 THOU/MM3 (ref 1.8–7.7)
SODIUM BLD-SCNC: 143 MEQ/L (ref 135–145)
TOTAL PROTEIN: 6.5 G/DL (ref 6.1–8)
URIC ACID: 4.5 MG/DL (ref 2.4–5.7)
WBC # BLD: 6.8 THOU/MM3 (ref 4.8–10.8)

## 2019-06-04 PROCEDURE — 85025 COMPLETE CBC W/AUTO DIFF WBC: CPT

## 2019-06-04 PROCEDURE — 74177 CT ABD & PELVIS W/CONTRAST: CPT

## 2019-06-04 PROCEDURE — 80053 COMPREHEN METABOLIC PANEL: CPT

## 2019-06-04 PROCEDURE — 85651 RBC SED RATE NONAUTOMATED: CPT

## 2019-06-04 PROCEDURE — 83615 LACTATE (LD) (LDH) ENZYME: CPT

## 2019-06-04 PROCEDURE — 84550 ASSAY OF BLOOD/URIC ACID: CPT

## 2019-06-04 PROCEDURE — 36415 COLL VENOUS BLD VENIPUNCTURE: CPT

## 2019-06-04 PROCEDURE — 6360000004 HC RX CONTRAST MEDICATION: Performed by: INTERNAL MEDICINE

## 2019-06-04 PROCEDURE — 82232 ASSAY OF BETA-2 PROTEIN: CPT

## 2019-06-04 PROCEDURE — 82565 ASSAY OF CREATININE: CPT

## 2019-06-04 RX ADMIN — IOPAMIDOL 80 ML: 755 INJECTION, SOLUTION INTRAVENOUS at 11:11

## 2019-06-04 RX ADMIN — IOHEXOL 50 ML: 240 INJECTION, SOLUTION INTRATHECAL; INTRAVASCULAR; INTRAVENOUS; ORAL at 11:12

## 2019-06-06 LAB — BETA-2 MICROGLOBULIN: 2.6 MG/L (ref 1.1–2.4)

## 2021-05-26 ENCOUNTER — HOSPITAL ENCOUNTER (OUTPATIENT)
Dept: WOMENS IMAGING | Age: 71
Discharge: HOME | End: 2021-05-26
Payer: MEDICARE

## 2021-05-26 DIAGNOSIS — Z78.0 POSTMENOPAUSAL: ICD-10-CM

## 2021-05-26 PROCEDURE — 77080 DXA BONE DENSITY AXIAL: CPT

## 2022-01-25 ENCOUNTER — HOSPITAL ENCOUNTER (OUTPATIENT)
Age: 72
Discharge: HOME | End: 2022-01-25
Payer: MEDICARE

## 2022-01-25 PROCEDURE — U0003 INFECTIOUS AGENT DETECTION BY NUCLEIC ACID (DNA OR RNA); SEVERE ACUTE RESPIRATORY SYNDROME CORONAVIRUS 2 (SARS-COV-2) (CORONAVIRUS DISEASE [COVID-19]), AMPLIFIED PROBE TECHNIQUE, MAKING USE OF HIGH THROUGHPUT TECHNOLOGIES AS DESCRIBED BY CMS-2020-01-R: HCPCS

## 2022-01-25 PROCEDURE — U0005 INFEC AGEN DETEC AMPLI PROBE: HCPCS

## 2022-01-26 LAB
SARS-COV-2: DETECTED
SOURCE: ABNORMAL

## 2022-09-13 ENCOUNTER — HOSPITAL ENCOUNTER (OUTPATIENT)
Dept: INTERVENTIONAL RADIOLOGY/VASCULAR | Age: 72
Discharge: HOME | End: 2022-09-13
Payer: MEDICARE

## 2022-09-13 VITALS
DIASTOLIC BLOOD PRESSURE: 68 MMHG | WEIGHT: 200 LBS | SYSTOLIC BLOOD PRESSURE: 128 MMHG | OXYGEN SATURATION: 95 % | TEMPERATURE: 98.1 F | HEART RATE: 72 BPM | BODY MASS INDEX: 32.28 KG/M2 | RESPIRATION RATE: 18 BRPM

## 2022-09-13 DIAGNOSIS — C91.10 CLL (CHRONIC LYMPHOCYTIC LEUKEMIA) (HCC): ICD-10-CM

## 2022-09-13 PROCEDURE — 2580000003 HC RX 258: Performed by: RADIOLOGY

## 2022-09-13 PROCEDURE — 2709999900 IR FLUORO GUIDED CVA DEVICE PLMT/REPLACE/REMOVAL

## 2022-09-13 PROCEDURE — 6360000002 HC RX W HCPCS: Performed by: RADIOLOGY

## 2022-09-13 PROCEDURE — 36590 REMOVAL TUNNELED CV CATH: CPT

## 2022-09-13 PROCEDURE — 99153 MOD SED SAME PHYS/QHP EA: CPT

## 2022-09-13 PROCEDURE — 77001 FLUOROGUIDE FOR VEIN DEVICE: CPT

## 2022-09-13 PROCEDURE — 99152 MOD SED SAME PHYS/QHP 5/>YRS: CPT

## 2022-09-13 RX ORDER — MIDAZOLAM HYDROCHLORIDE 1 MG/ML
INJECTION INTRAMUSCULAR; INTRAVENOUS
Status: COMPLETED | OUTPATIENT
Start: 2022-09-13 | End: 2022-09-13

## 2022-09-13 RX ORDER — SODIUM CHLORIDE 450 MG/100ML
INJECTION, SOLUTION INTRAVENOUS CONTINUOUS
Status: DISCONTINUED | OUTPATIENT
Start: 2022-09-13 | End: 2022-09-14 | Stop reason: HOSPADM

## 2022-09-13 RX ORDER — FENTANYL CITRATE 50 UG/ML
50 INJECTION, SOLUTION INTRAMUSCULAR; INTRAVENOUS ONCE
Status: COMPLETED | OUTPATIENT
Start: 2022-09-13 | End: 2022-09-13

## 2022-09-13 RX ORDER — FENTANYL CITRATE 50 UG/ML
INJECTION, SOLUTION INTRAMUSCULAR; INTRAVENOUS
Status: COMPLETED | OUTPATIENT
Start: 2022-09-13 | End: 2022-09-13

## 2022-09-13 RX ORDER — MIDAZOLAM HYDROCHLORIDE 1 MG/ML
1 INJECTION INTRAMUSCULAR; INTRAVENOUS ONCE
Status: COMPLETED | OUTPATIENT
Start: 2022-09-13 | End: 2022-09-13

## 2022-09-13 RX ADMIN — FENTANYL CITRATE 50 MCG: 50 INJECTION, SOLUTION INTRAMUSCULAR; INTRAVENOUS at 08:21

## 2022-09-13 RX ADMIN — SODIUM CHLORIDE: 4.5 INJECTION, SOLUTION INTRAVENOUS at 07:04

## 2022-09-13 RX ADMIN — FENTANYL CITRATE 50 MCG: 50 INJECTION, SOLUTION INTRAMUSCULAR; INTRAVENOUS at 08:11

## 2022-09-13 RX ADMIN — MIDAZOLAM HYDROCHLORIDE 1 MG: 1 INJECTION, SOLUTION INTRAMUSCULAR; INTRAVENOUS at 08:10

## 2022-09-13 RX ADMIN — CEFAZOLIN 2000 MG: 10 INJECTION, POWDER, FOR SOLUTION INTRAVENOUS at 07:05

## 2022-09-13 RX ADMIN — MIDAZOLAM 1 MG: 1 INJECTION INTRAMUSCULAR; INTRAVENOUS at 08:21

## 2022-09-13 ASSESSMENT — PAIN SCALES - GENERAL
PAINLEVEL_OUTOF10: 0

## 2022-09-13 NOTE — DISCHARGE INSTRUCTIONS
INSERTION/REMOVAL OF CAPD/MEDIPORT/POWER PORT DISCHARGE INSTRUCTION SHEET    Diet: As before  Care of catheter site:  Keep dressing clean and dry  Ice to catheter site for next 4 hours (20 minutes every hour)  Limit activity to surgical site (no pushing, pulling, or lifting) for next 2 days  Sponge bath only for next 5 days - then may shower, remove dressing and leave open to air. Steri strips will fall off on their own - do not remove  No driving today    Return to nearest Emergency Room if any of the following:  Symptoms of bleeding, drainage, swelling  Increase in pain  Elevated temperature over 101 degrees    If you have any problems call your doctor or return to the nearest Emergency Room. SEDATION/ANALGESIA INFORMATION HOME GOING ADVICE    Review the following information with the patient prior to the procedure. Sedation/agalgesia is used during short medical procedures under controlled supervision. The medication will produce a strong relaxation. You will be able to hear, speak and follow instructions, but you memory and alertness will be decreased. You will be able to swallow and breathe on your own. During sedation/analgesia you blood pressure, hear and breathing will be watched closely. After the procedure, you may not remember what was said or done. Procedure:      Date:  You may have the following effects from the medication. Drowsiness, dizziness, sleepiness or confusion. Difficulty remembering or delayed reaction times. Loss of fine muscle control or difficulty with your balance especially while walking. Difficulty focusing or blurred vision. You may not be aware of slight changes in your behavior and/or your reaction time because of the medication used during the procedure. Therefore you should follow these instructions. Have someone responsible help you with your care. Do not drive for 24 hours.   Do not operate equipment for 24 hours ([employer], power tools, kitchen accessories, stove). Do not drink any alcoholic beverages for a minimum of 24 hours. Do not make important personal, legal or business decisions for 24 hours. You may experience dizziness or lightheadedness. Move slowly and carefully, do not make sudden position changes. Drink extra amounts of fluids today. Increase your diet as tolerated (unless you have received specific instructions from your doctor). If you feel nauseated, continue with liquids until nausea is gone  Notify your physician if you have not urinated within 8 hours after the procedure. Resume your medications unless otherwise instructed. contact your physician if you have any questions or concerns. I have been informed and understand how to care for myself/the patient at home. i know who to call if Jitendra Ware question or concerns. The adult responsible for my discharge care is:       You have received the sedation/analgesia medications/s:      IF YOU REPORT TO AN EMERGENCY ROOM, DOCTOR'S OFFICE OR HOSPITAL WITHIN 24 HRS AFTER PROCEDURE, BRING THIS SHEET WITH YOU AND GIVE IT THE PHYSICIAN OR NURSE ATTENDING YOU.

## 2022-09-13 NOTE — PROGRESS NOTES
[facility] ambulated into room with  TIA for mediport removal. Pt rights and responsibilities offered to pt. Procedure explained and questions answered. No labs were ordered for procedure. Iv started and atb hung for Bledsoe. She has the call light within reach and no other needs at this time. She has been NPO for over 4 hours. She does not take blood thinners. [address] 100 was picked up for procedure    0900  yuly is doing well. Her dressing is clean dry intact she states no pain, no nausea. She is resting comfortably she states. Ice pack on dressing. She has the call light with no needs at this time. She was offered drink and snack. 0915  ice pack on dressing, dressing CDI, no issues at this time. Call light within reach. 0930 no pain, no nausea. Bledsoe resting with eyes closed. , dressing unchanged. 1000 ice pack sent with Bledsoe dressing clean dry intact, no pain. Yuly awake and talking, alert and oriented. She has no other needs today at this time. She was wheeled out via wheelchair for d/c. D/c instructions explained to her and Kiah Scott and they verbalized understanding.        _m___ Safety:       (Environmental)  [place] to environment  Ensure ID band is correct and in place/ allergy band as needed  Assess for fall risk  Initiate fall precautions as applicable (fall band, side rails, etc.)  Call light within reach  Bed in low position/ wheels locked    __m__ Pain:       Assess pain level and characteristics  Administer analgesics as ordered  Assess effectiveness of pain management and report to MD as needed    _m___ Knowledge Deficit:  Assess baseline knowledge  Provide teaching at level of understanding  Provide teaching via preferred learning method  Evaluate teaching effectiveness    _m___ Hemodynamic/Respiratory Status:       (Pre and Post Procedure Monitoring)  Assess/Monitor vital signs and LOC  Assess Baseline SpO2 prior to any sedation  Obtain weight/height  Assess vital signs/ LOC until patient meets discharge criteria  Monitor procedure site and notify MD of any issues

## 2022-09-13 NOTE — H&P
[address] [address]  Sedation/Analgesia History & Physical    Pt Name: Chin Webster  MRN: [id number]  YOB: 1950  Provider Performing Procedure: Igor Springer MD, MD  Primary Care Physician: Deepak Cotton,     PRE-PROCEDURE   DNR-CCA/DNR-CC []Yes [x]No  Brief History/Pre-Procedure Diagnosis: chemotherapy finished          MEDICAL HISTORY  []CAD/Valve  []Liver Disease  []Lung Disease []Diabetes  []Hypertension []Renal Disease  []Additional information:       has a past medical history of Cancer ([facility] [address] [facility] Depression, GERD (gastroesophageal reflux disease), Hyperlipidemia, Hypertension, and Hypothyroidism. SURGICAL HISTORY   has a past surgical history that includes Carpal tunnel release; Total abdominal hysterectomy w/ bilateral salpingoophorectomy (1985); Breast lumpectomy (Right, ?2002); other surgical history (2007?); Hysterectomy (1975); pr needle biopsy, lymph node(s) (Left, 8/15/2017); and bone marrow biopsy (08/2017). Additional information:       ALLERGIES   Allergies as of 09/13/2022 - Fully Reviewed 09/13/2022   Allergen Reaction Noted    Zithromax [azithromycin]  11/02/2012     Additional information:       MEDICATIONS   Coumadin Use Last 5 Days [x]No []Yes  Antiplatelet drug therapy use last 5 days  [x]No []Yes  Other anticoagulant use last 5 days  [x]No []Yes    Current Outpatient Medications:     lisinopril-hydrochlorothiazide (PRINZIDE;ZESTORETIC) 20-25 MG per tablet, Take 1 tablet by mouth daily, Disp: , Rfl:     citalopram (CELEXA) 20 MG tablet, Take 20 mg by mouth daily. , Disp: , Rfl:     Vitamin D (CHOLECALCIFEROL) 1000 UNITS CAPS capsule, Take 1,000 Units by mouth daily Takes Vitamin D in the winter months, Disp: , Rfl:     Levothyroxine Sodium (SYNTHROID PO), Take 100 mcg by mouth daily , Disp: , Rfl:     pravastatin (PRAVACHOL) 40 MG tablet, Take 40 mg by mouth daily.   , Disp: , Rfl:     Lansoprazole (PREVACID PO), Take by mouth. OTC P.R.N. , Disp: , Rfl:     Current Facility-Administered Medications:     0.45 % sodium chloride infusion, , IntraVENous, Continuous, Laurann Severe, MD, Last Rate: 20 mL/hr at 09/13/22 0704, New Bag at 09/13/22 0704    ceFAZolin (ANCEF) 2000 mg in dextrose 5 % 50 mL IVPB, 2,000 mg, IntraVENous, 60 Min Pre-Op, Laurann Severe, MD, Last Rate: 100 mL/hr at 09/13/22 0705, 2,000 mg at 09/13/22 4020  Prior to Admission medications    Medication Sig Start Date End Date Taking? Authorizing Provider   lisinopril-hydrochlorothiazide (PRINZIDE;ZESTORETIC) 20-25 MG per tablet Take 1 tablet by mouth daily    Historical Provider, MD   citalopram (CELEXA) 20 MG tablet Take 20 mg by mouth daily. Historical Provider, MD   Vitamin D (CHOLECALCIFEROL) 1000 UNITS CAPS capsule Take 1,000 Units by mouth daily Takes Vitamin D in the winter months    Historical Provider, MD   Levothyroxine Sodium (SYNTHROID PO) Take 100 mcg by mouth daily     Historical Provider, MD   pravastatin (PRAVACHOL) 40 MG tablet Take 40 mg by mouth daily. Historical Provider, MD   Lansoprazole (PREVACID PO) Take  by mouth. OTC P.R.N.      Historical Provider, MD     Additional information:       VITAL SIGNS   Vitals:    09/13/22 0830   BP: 111/67   Pulse: 66   Resp: 15   Temp:    SpO2: 90%       PHYSICAL:   Heart:  [x]Regular rate and rhythm  []Other:    Lungs:  [x]Clear    []Other:    Abdomen: [x]Soft    []Other:    Mental Status: [x]Alert & Oriented  []Other:      PLANNED PROCEDURE   []Biospy []Arteriogram              []Drainage   [x]Mediport removal   []Fistulogram []IV access       []Vertebroplasty / Augmentation  []IVC filter []Dialysis catheter []Biliary drainage  []Other: []CAPD Catheter []Nephrostomy Tube / Stent  SEDATION  Planned agent:[x]Midazolam []Meperidine [x]Sublimaze []Dilaudid []Morphine     []Diazepam  []Other:     ASA Classification:  []1 [x]2 []3 []4 []5  Class 1: A normal healthy patient  Class 2: Pt with mild to moderate systemic disease  Class 3: Severe systemic disease or disturbance  Class 4: Severe systemic disorders that are already life threatening. Class 5: Moribund pt with little chances of survival, for more than 24 hours. Mallampati I Airway Classification:   []1 [x]2 []3 []4    [x]Pre-procedure diagnostic studies complete and results available. Comment:    [x]Previous sedation/anesthesia experiences assessed. Comment:    [x]The patient is an appropriate candidate to undergo the planned procedure sedation and anesthesia. (Refer to nursing sedation/analgesia documentation record)  [x]Formulation and discussion of sedation/procedure plan, risks, and expectations with patient and/or responsible adult completed. [x]Patient examined immediately prior to the procedure.  (Refer to nursing sedation/analgesia documentation record)    Rebecca Gutierrez MD, MD  Electronically signed 9/13/2022 at 8:34 AM

## 2022-09-13 NOTE — PROGRESS NOTES
0845 pt back from specials. Vitals stable. Pt denies pain. Site on right upper chest clean, dry and intact. Ice pack applied.

## 2022-09-13 NOTE — OP NOTE
Department of Radiology  Post Procedure Progress Note      Pre-Procedure Diagnosis:  chemotherapy finished    Procedure Performed:  mediport removal     Anesthesia: local / versed and fentanyl    Findings: successful    Immediate Complications:  None    Estimated Blood Loss: minimal    SEE DICTATED PROCEDURE NOTE FOR COMPLETE DETAILS.     Starr Salazar MD   9/13/2022 8:35 AM

## 2022-09-13 NOTE — H&P
Formulation and discussion of sedation / procedure plans, risks, benefits, side effects and alternatives with patient and/or responsible adult completed.     Electronically signed by Richard Amaral MD on 9/13/22 at 8:33 AM EDT

## 2022-09-13 NOTE — PROGRESS NOTES
0800 Patient received in IR for medi-port removal.  0800 This procedure has been fully reviewed with the patient and written informed consent has been obtained. 7949 Moved to table and attached to monitor. 6214 Dr. Roxane Benson in; spoke to patient and assessment obtained. 2832 Patient prepped for procedure. [id number] Procedure started with Dr. Roxane Benson. 0825 Port removed per Dr Roxane Benson. Pt tolerating procedure well.  0826 Incision right chest approximated with suture. 0830 Dermabond with steri-strips applied to incision. 0830 4 x 4 with op-site applied to incision. Site without redness, swelling or hematoma. 5641 Patient on bed; comfort ensured. Right  chest dressing remains dry and intact with area soft. Ice pack to site. 3523 Report called to HOSP PEDRO PABLO STODDARD RN. 3455 Patient taken to OPN. Offers no complaints.

## 2022-12-15 ENCOUNTER — HOSPITAL ENCOUNTER (OUTPATIENT)
Dept: CT IMAGING | Age: 72
Discharge: HOME | End: 2022-12-15

## 2022-12-15 DIAGNOSIS — Z00.6 EXAMINATION FOR NORMAL COMPARISON FOR CLINICAL RESEARCH: ICD-10-CM

## 2022-12-16 ENCOUNTER — HOSPITAL ENCOUNTER (OUTPATIENT)
Age: 72
Discharge: HOME | End: 2022-12-16
Payer: MEDICARE

## 2022-12-16 ENCOUNTER — OFFICE VISIT (OUTPATIENT)
Dept: SURGERY | Age: 72
End: 2022-12-16
Payer: MEDICARE

## 2022-12-16 VITALS
DIASTOLIC BLOOD PRESSURE: 80 MMHG | RESPIRATION RATE: 18 BRPM | HEART RATE: 72 BPM | WEIGHT: 206.2 LBS | BODY MASS INDEX: 33.14 KG/M2 | TEMPERATURE: 96.5 F | HEIGHT: 66 IN | SYSTOLIC BLOOD PRESSURE: 120 MMHG | OXYGEN SATURATION: 98 %

## 2022-12-16 DIAGNOSIS — C91.10 CHRONIC LYMPHOCYTIC LEUKEMIA OF B-CELL TYPE NOT HAVING ACHIEVED REMISSION (HCC): Primary | ICD-10-CM

## 2022-12-16 DIAGNOSIS — Z01.818 PRE-OP TESTING: ICD-10-CM

## 2022-12-16 DIAGNOSIS — R59.0 LYMPHADENOPATHY OF RIGHT CERVICAL REGION: ICD-10-CM

## 2022-12-16 LAB
EKG ATRIAL RATE: 79 BPM
EKG P AXIS: 36 DEGREES
EKG P-R INTERVAL: 178 MS
EKG Q-T INTERVAL: 410 MS
EKG QRS DURATION: 88 MS
EKG QTC CALCULATION (BAZETT): 470 MS
EKG R AXIS: -40 DEGREES
EKG T AXIS: -93 DEGREES
EKG VENTRICULAR RATE: 79 BPM

## 2022-12-16 PROCEDURE — 99203 OFFICE O/P NEW LOW 30 MIN: CPT | Performed by: SURGERY

## 2022-12-16 PROCEDURE — 1123F ACP DISCUSS/DSCN MKR DOCD: CPT | Performed by: SURGERY

## 2022-12-16 PROCEDURE — 93005 ELECTROCARDIOGRAM TRACING: CPT | Performed by: SURGERY

## 2022-12-16 RX ORDER — APIXABAN 5 MG/1
TABLET, FILM COATED ORAL
COMMUNITY
Start: 2022-12-01

## 2022-12-16 RX ORDER — VALSARTAN AND HYDROCHLOROTHIAZIDE 80; 12.5 MG/1; MG/1
TABLET, FILM COATED ORAL DAILY
COMMUNITY
Start: 2020-07-28

## 2022-12-16 RX ORDER — FERROUS SULFATE 325(65) MG
325 TABLET ORAL
COMMUNITY
End: 2022-12-16 | Stop reason: ALTCHOICE

## 2022-12-16 RX ORDER — LEVOTHYROXINE SODIUM 100 MCG
TABLET ORAL
COMMUNITY
Start: 2022-09-30 | End: 2022-12-16 | Stop reason: ALTCHOICE

## 2022-12-16 RX ORDER — OMEPRAZOLE 20 MG/1
CAPSULE, DELAYED RELEASE ORAL
COMMUNITY
Start: 2022-10-06

## 2022-12-16 ASSESSMENT — ENCOUNTER SYMPTOMS
CHEST TIGHTNESS: 0
RECTAL PAIN: 0
NAUSEA: 0
SINUS PRESSURE: 0
ABDOMINAL DISTENTION: 0
TROUBLE SWALLOWING: 0
APNEA: 0
WHEEZING: 0
RHINORRHEA: 0
EYE ITCHING: 0
STRIDOR: 0
BLOOD IN STOOL: 0
SHORTNESS OF BREATH: 0
EYE REDNESS: 0
BACK PAIN: 0
COLOR CHANGE: 0
VOMITING: 0
VOICE CHANGE: 0
PHOTOPHOBIA: 0
DIARRHEA: 0
SORE THROAT: 0
ABDOMINAL PAIN: 0
ALLERGIC/IMMUNOLOGIC NEGATIVE: 1
FACIAL SWELLING: 0
COUGH: 0
CHOKING: 0
ANAL BLEEDING: 0
CONSTIPATION: 0
EYE PAIN: 0
EYE DISCHARGE: 0

## 2022-12-16 NOTE — H&P (VIEW-ONLY)
Yvette Carter (:  1950)     ASSESSMENT:  1. Right greater than left cervical lymphadenopathy  2. CLL stage III  3. Large B-cell lymphoma  4. History right breast invasive carcinoma and DCIS  5. Recurrent small lymphocytic lymphoma  6. Thrombosed right internal jugular vein    PLAN:  1. Schedule Yuly for right cervical lymph node excisional biopsy. 2. She will undergo pre-operative clearance per anesthesia guidelines with risk factors listed under the past medical history diagnosis & problem list.  3. The risks, benefits and alternatives were discussed with Fransisco Palma including non-operative management. All questions answered. She understands and wishes to proceed with surgical intervention. 4. Restrictions discussed with Fransisco Palma and she expresses understanding. 5. She is advised to call back directly if there are further questions/concerns, or if her symptoms worsen prior to surgery. SUBJECTIVE/OBJECTIVE:    Chief Complaint   Patient presents with    Surgical Consult     New patient- referred by Dr. Sheryle Kelly - Right neck lymphadenopathy - Hx of chronic lymphocytic leukemia-requesting lymph node biopsy     HPI  Fransisco Palma is a 75-year-old female who presents for the need of a right cervical lymph node excisional biopsy. She has a history of right breast invasive carcinoma and DCIS. Also has a history of CLL stage III, large B-cell lymphoma stage III and recurrent small lymphocytic lymphoma. She follows with Dr. Sheryle Kelly of hematology/oncology. Recently found to have a thrombosed right internal jugular vein diagnosed earlier this month. Currently on Eliquis. Denies current chest pain or shortness of breath. No abdominal pain. No unexplained weight loss. Intermittent sweats. No fevers. Denies fatigue. Normal bowel function. No hematochezia or melena. No new urinary complaints. Recent imaging earlier this month demonstrated large right and smaller left cervical lymphadenopathy.   CT chest, abdomen and pelvis demonstrated enlarged bilateral mediastinal, hilar and retroperitoneal lymphadenopathy. Splenomegaly noted to be slightly larger. Patient in need of excisional biopsy for further diagnosis of new/worsening lymphadenopathy. Review of Systems   Constitutional:  Positive for diaphoresis. Negative for activity change, appetite change, chills, fatigue, fever and unexpected weight change. HENT:  Positive for tinnitus. Negative for congestion, dental problem, drooling, ear discharge, ear pain, facial swelling, hearing loss, mouth sores, nosebleeds, postnasal drip, rhinorrhea, sinus pressure, sneezing, sore throat, trouble swallowing and voice change. Eyes:  Negative for photophobia, pain, discharge, redness, itching and visual disturbance. Respiratory:  Negative for apnea, cough, choking, chest tightness, shortness of breath, wheezing and stridor. Cardiovascular:  Negative for chest pain, palpitations and leg swelling. Gastrointestinal:  Negative for abdominal distention, abdominal pain, anal bleeding, blood in stool, constipation, diarrhea, nausea, rectal pain and vomiting. Endocrine: Negative. Genitourinary:  Negative for decreased urine volume, difficulty urinating, dyspareunia, dysuria, enuresis, flank pain, frequency, genital sores, hematuria, menstrual problem, pelvic pain, urgency, vaginal bleeding, vaginal discharge and vaginal pain. Musculoskeletal:  Negative for arthralgias, back pain, gait problem, joint swelling, myalgias, neck pain and neck stiffness. Skin:  Negative for color change, pallor, rash and wound. Allergic/Immunologic: Negative. Neurological:  Negative for dizziness, tremors, seizures, syncope, facial asymmetry, speech difficulty, weakness, light-headedness, numbness and headaches. Hematological:  Positive for adenopathy. Does not bruise/bleed easily.    Psychiatric/Behavioral:  Negative for agitation, behavioral problems, confusion, decreased concentration, dysphoric mood, hallucinations, self-injury, sleep disturbance and suicidal ideas. The patient is not nervous/anxious and is not hyperactive. Past Medical History:   Diagnosis Date    Adenopathy     Cancer [facility]) ?2002    breast - right    CLL (chronic lymphocytic leukemia) ([facility] [address] ? 2005    CLL - Dr. Flaquito Marie 01/2022    Depression     GERD (gastroesophageal reflux disease)     Hyperlipidemia     Hypertension     Hypothyroidism     Iron deficiency anemia     NHL (nodular histiocytic lymphoma) (HCC)     Nonfamilial hypogammaglobulinemia (HCC)     Right jugular vein thrombosis     on Eliquis    Thyroid nodule        Past Surgical History:   Procedure Laterality Date    BONE MARROW BIOPSY  08/2017    BREAST LUMPECTOMY Right 2007    Dr Pranay Ibarra (CERVIX STATUS UNKNOWN)  1975    partial due to IUD    IR PORT PLACEMENT EQUAL OR GREATER THAN 5 YEARS  2017    Done in [facility] IR    MASTECTOMY Right 02/26/2019    Dr. Pasquale Dillard  2007?    saliva stone removed    OTHER SURGICAL HISTORY  08/27/2010    left parotidectomy    OTHER SURGICAL HISTORY  03/2019    retroperitoneal LN biopsy    OTHER SURGICAL HISTORY  09/13/2022    port removal    RI NEEDLE BIOPSY, LYMPH NODE(S) Left 08/15/2017    EXCISION OF LEFT CERVICAL LYMPH NODE BIOPSY X 2 performed by Aditi Rogers MD at [address]  (CERVIX REMOVED)  1985    B/L       Current Outpatient Medications   Medication Sig Dispense Refill    ELIQUIS 5 MG TABS tablet take 2 tablet by mouth twice a day for 7 days then 1 tablet twice a day for 30 DAYS      valsartan-hydroCHLOROthiazide (DIOVAN-HCT) 80-12.5 MG per tablet Take by mouth daily      omeprazole (PRILOSEC) 20 MG delayed release capsule       Vitamin D (CHOLECALCIFEROL) 1000 UNITS CAPS capsule Take 1,000 Units by mouth daily Takes Vitamin D in the winter months      Levothyroxine Sodium (SYNTHROID PO) Take 100 mcg by mouth daily pravastatin (PRAVACHOL) 40 MG tablet Take 40 mg by mouth daily. No current facility-administered medications for this visit. Allergies   Allergen Reactions    Zithromax [Azithromycin]      hives       Family History   Problem Relation Age of Onset    Heart Disease Father         congestive heart failure    Cancer Other         colon and thyroid    Cancer Paternal Uncle         liver       Social History     Socioeconomic History    Marital status:      Spouse name: Not on file    Number of children: Not on file    Years of education: Not on file    Highest education level: Not on file   Occupational History    Not on file   Tobacco Use    Smoking status: Former     Packs/day: 0.50     Years: 10.00     Pack years: 5.00     Types: Cigarettes     Quit date: 1995     Years since quittin.9    Smokeless tobacco: Never   Substance and Sexual Activity    Alcohol use: No    Drug use: No    Sexual activity: Not on file   Other Topics Concern    Not on file   Social History Narrative    Not on file     Social Determinants of Health     Financial Resource Strain: Not on file   Food Insecurity: Not on file   Transportation Needs: Not on file   Physical Activity: Not on file   Stress: Not on file   Social Connections: Not on file   Intimate Partner Violence: Not on file   Housing Stability: Not on file     Vitals:    22 0741   BP: 120/80   Site: Left Upper Arm   Position: Sitting   Cuff Size: Medium Adult   Pulse: 72   Resp: 18   Temp: (!) 96.5 °F (35.8 °C)   TempSrc: Temporal   SpO2: 98%   Weight: 206 lb 3.2 oz (93.5 kg)   Height: 5' 6\" (1.676 m)     Body mass index is 33.28 kg/m². Wt Readings from Last 3 Encounters:   22 206 lb 3.2 oz (93.5 kg)   22 200 lb (90.7 kg)   17 213 lb (96.6 kg)     Physical Exam  Vitals reviewed. Constitutional:       General: She is not in acute distress. Appearance: She is well-developed. She is not diaphoretic.    HENT:      Head: Normocephalic and atraumatic. Right Ear: External ear normal.      Left Ear: External ear normal.      Nose: Nose normal.   Eyes:      General: No scleral icterus. Right eye: No discharge. Left eye: No discharge. Conjunctiva/sclera: Conjunctivae normal.   Cardiovascular:      Rate and Rhythm: Normal rate and regular rhythm. Heart sounds: Normal heart sounds. Pulmonary:      Effort: Pulmonary effort is normal. No respiratory distress. Breath sounds: Normal breath sounds. No wheezing or rales. Chest:      Chest wall: No tenderness. Abdominal:      General: Bowel sounds are normal. There is no distension. Palpations: Abdomen is soft. There is no mass. Tenderness: There is no abdominal tenderness. There is no guarding or rebound. Musculoskeletal:         General: No tenderness. Normal range of motion. Cervical back: Normal range of motion and neck supple. Lymphadenopathy:      Head:      Right side of head: No submental, submandibular, tonsillar, preauricular, posterior auricular or occipital adenopathy. Left side of head: No submental, submandibular, tonsillar, preauricular, posterior auricular or occipital adenopathy. Cervical: Cervical adenopathy present. Upper Body:      Right upper body: No supraclavicular, axillary or pectoral adenopathy. Left upper body: No supraclavicular, axillary or pectoral adenopathy. Lower Body: No right inguinal adenopathy. No left inguinal adenopathy. Skin:     General: Skin is warm and dry. Coloration: Skin is not pale. Findings: No erythema or rash. Neurological:      Mental Status: She is alert and oriented to person, place, and time. Cranial Nerves: No cranial nerve deficit. Psychiatric:         Behavior: Behavior normal.         Thought Content:  Thought content normal.         Judgment: Judgment normal.     Lab Results   Component Value Date    WBC 5.4 08/25/2022    HGB 11.4 (L) 2022    HCT 33.8 (L) 2022    MCV 78.9 (L) 2022     2022     Lab Results   Component Value Date     (L) 2022    K 3.7 2022    CL 99 (L) 2022    CO2 29 2022     Lab Results   Component Value Date    CREATININE 0.7 2022     Lab Results   Component Value Date    ALT 29 2022    AST 31 2022    ALKPHOS 72 2022    BILITOT 0.6 2022     No results found for: LIPASE    Patient Active Problem List   Diagnosis    Chronic lymphocytic leukemia of B-cell type not having achieved remission [facility])    Chemotherapy management, encounter for    Leukocytosis    Breast cancer, right breast [facility])     Narrative   Ordering Provider: [address] [place] [address]          Radiology Department  Patient:  Aloma Ormond [place]. :  1950   Sex: Amy, [address] [facility]  Location:  [facility]     [phone]   Unit #:   [id number]       Acct #:  [de-identified]       Ordering Phys: Sima Elaine DO            Exam Date: 22     Accession #:  [id number]     Exam:  CT   CT Neck With Contrast 94550     Result: See Report            **     ADDENDUM:    **     ====================     ADDENDUM     ====================     Right jugular lymphadenopathy at the level of the     floor the mouth measures 2.2 x 5.2 cm. Right jugular lymphadenopathy at     the level of the larynx measures 2.5 x 4.0 cm.   Right posterior triangle     lymphadenopathy collectively measures 4.0 x 4.5 cm to          Electronically Signed:     Gallo Arnold MD     2022/12/15 at 11:11 EST     Reading Location ID and State: [address] [facility],[address] [phone], Service support  [phone], Fax [phone]                                   Addendum Dictated by:  Kiah Dan MD on 12/15/22 at 46     Transcribed by:  Kiah Dan MD on 12/15/22 at 1111          Report Signed by:  Kiah Dan MD on 12/15/22 1111 EXAM:  CT NECK WITH INTRAVENOUS CONTRAST          CLINICAL INDICATION:  Chronic lymphocytic leukemia of B-cell type. PT     STATES SHE HAD NON HODGKIN''S LYMPHOMA THEN BREAST CANCER 4 YEARS AGO. C/O     RIGHT SIDE LYMPH NODES SWELLING X 3 WEEKS. PT STATES SHE HAD HER PORT     REMOVED AND NOW HAS SWOLLEN LYMPH NODES. PT HAD AN US DONE LAST WEEK. HX     OF RIGHT MASTECTOMY, PAROTID GLAND AND LYMPH NODE IN BACK OF NECK REMOVED. TECHNIQUE:  Helically acquired images were obtained of the neck with     intravenous contrast.  This CT exam was performed using one or more of the     following dose reduction techniques:  automated exposure control,     adjustment of the mA and/or kV according to patient size, and/or use of     iterative reconstruction technique. This report was created using     [employer] report generation technology. CONTRAST:  IV Omnipaque 300 75 ML          COMPARISON:  None. FINDINGS:          NASOPHARYNX:  Normal.          SUPRAHYOID NECK:  Normal.  Oropharynx, oral cavity, parapharyngeal space     and retropharyngeal space are unremarkable. INFRAHYOID NECK:  Normal.  The larynx, hypopharynx and supraglottis are     unremarkable. SUBMANDIBULAR/PAROTID GLANDS:  Normal.  Glands are normal in size. THYROID:  Normal normal sized. 4 mm macrocalcification within the left     thyroid lobe. BONES/JOINTS:  No acute fracture. SOFT TISSUES:  Normal.          VASCULATURE:  No acute findings. LYMPH NODES:  Todd enlargement within the right side of the neck     measuring up to 4.5 cm in diameter involving levels through 7. Multiple     subcentimeter lymph nodes are noted on the left along multiple levels. Appearance is significantly changed from prior exam.          LUNG APICES:  Unremarkable as visualized.           IMPRESSION:          Large right and smaller left cervical lymph nodes consistent with     lymphoproliferative disorder. Electronically Signed:     Luís Montoya MD      at 13:14 EST     Reading Location ID and State: [address] / Aaron Berry     Tel [phone], Service support  [phone], Fax [phone]                         cc: Eryn Iron DO; Pink Spry DO (Office)               Dictated by:  Sandro Krishnan MD on 22 1314     Technologist:       Transcribed by:  Sandro Krishnan MD on 22 1314          Report Signed by:  Carrillo Lacey MD on 22 1314     Narrative   Ordering Provider: [address] [place] [address]          Radiology Department  Patient:  Jonas Swensonciabmargaret. :  1950   Sex: Amy, Geoffrey [facility]  Location:  [facility]     [phone]   Unit #:   [id number]       Acct #:  [de-identified]       Ordering Phys: Eryn Iron DO            Exam Date: 22     Accession #:  [id number]     Exam:  CT   CT Abd/Pelvis W/Contrast [id number]     Result: See Report            **     ADDENDUM:    **     ====================     ADDENDUM     ====================     Right paratracheal lymphadenopathy measures 1.5 x 2.5     cm. Azygos lymphadenopathy measures 3.0 x 3.5 cm. Subcarinal     lymphadenopathy measures 2.0 x 3.0 cm. There are several small retroperitoneal and mesenteric lymph nodes. The     largest discrete lymph node is a mesenteric lymph node just superior to     the third portion of duodenum and measures 2 x 3 cm.           Electronically Signed:     Cortes Joseph MD     2022/12/15 at 11:20 EST     Reading Location ID and State: [address] [facility],[address] [phone], Service support  [phone], Fax [phone]                                   Addendum Dictated by:  Charan Major MD on 12/15/22 at 1120     Transcribed by:  Charan Major MD on 12/15/22 at 1120          Report Signed by:  Charan Major MD on 12/15/22 1120                              EXAM:  CT CHEST, ABDOMEN AND PELVIS WITH INTRAVENOUS CONTRAST CLINICAL INDICATION:  Chronic lymphocytic leukemia of B-cell type. PT     STATES SHE HAD NON HODGKIN''S LYMPHOMA THEN BREAST CANCER 4 YEARS AGO. C/O     RIGHT SIDE LYMPH NODES SWELLING X 3 WEEKS. PT STATES SHE HAD HER PORT     REMOVED AND NOW HAS SWOLLEN LYMPH NODES. PT HAD AN US DONE LAST WEEK. HX     OF RIGHT MASTECTOMY, PAROTID GLAND AND LYMPH NODE IN BACK OF NECK REMOVED. TECHNIQUE:  Helically acquired images were obtained of the chest, abdomen     and pelvis with intravenous contrast.  This CT exam was performed using     one or more of the following dose reduction techniques:  automated     exposure control, adjustment of the mA and/or kV according to patient     size, and/or use of iterative reconstruction technique. This report was     created using [employer] report generation technology. CONTRAST:  IV/Oral Omnipaque 300 75 ML          COMPARISON:  CT Chest Abdomen Pelvis dated 8/25/2022          FINDINGS:     CHEST:          LUNGS AND PLEURAL SPACES:  Calcification within the left lung adjacent to     the major fissure again seen suggestive of chronic inflammatory change. Similar stable changes of scarring noted within the right middle lobe. No     mass. No pleural effusion or thickening. No pneumothorax. HEART:  Normal.  Heart size is normal.  No pericardial effusion. MEDIASTINUM:  Large mediastinal and hilar lymph nodes indicative of     recurrent lymphoma/leukemia. Small hiatal hernia. Esophagus is     unremarkable. THYROID:  Normal.  No thyroid lesions. ABDOMEN:          LIVER:  Mild hepatic steatosis. GALLBLADDER AND BILE DUCTS:  Normal.  No calcified gallstones. No     gallbladder distention or wall edema. No intra- or extrahepatic biliary     ductal dilation. PANCREAS:  Normal.  No focal cystic or solid mass.           SPLEEN:  Spleen is enlarged now measuring 15.1 cm in cephalocaudad     dimension from prior measurement of 13.9 cm. ADRENALS:  Normal.  No nodules. KIDNEYS AND URETERS:  Stable bilateral renal cysts. Normal renal size and     position. No hydronephrosis. STOMACH AND BOWEL:  Mild stool burden within the large bowel and rectum. PELVIS:          APPENDIX:  No evidence of acute appendicitis. BLADDER:  Normal.          REPRODUCTIVE:  Uterus is absent. CHEST, ABDOMEN and PELVIS:          INTRAPERITONEAL SPACE:  Normal.  No ascites or other fluid collection. No     free air. BONES/JOINTS:  Normal.  No suspicious lytic or blastic abnormality. SOFT TISSUES:  Surgical changes of right mastectomy and maxillary node     dissection again seen. No discrete abdominal or pelvic wall hernia. VASCULATURE:  Normal.  Aorta is non-dilated. No aortic dissection. No     obvious central pulmonary embolism although this study was not performed     with the pulmonary embolism protocol. LYMPH NODES:  Adenopathy noted bilaterally along the cervical levels 5B     and 7which were not present on prior exam.  New retroperitoneal adenopathy     noted at the level of the celiac axis, hepatoduodenal ligament and     adjacent to the aorta and inferior vena cava. IMPRESSION:          1. Enlarged bilateral cervical, mediastinal, hilar and retroperitoneal     lymphadenopathy consistent with recurrent lymphoproliferative disease. 2.  Mild splenomegaly, mildly increased from prior exam.          3.  Hepatic steatosis          Electronically Signed:     Mary Ellen Mauro.  MD Lindsay     2022/12/09 at 12:50 EST     Reading Location ID and State: Sherri Seymour / Gillian Mccurdy     Tel [phone], Service support  [phone], Fax [phone]                         cc: Tisha Weinberg DO; Sherill Fregoso DO (Office)               Dictated by:  Hollie Williamson MD on 12/09/22 1250     Technologist:   RT(R)(CT) Willard Frankel     Transcribed by:  Hollie Williamson MD on 22 1250          Report Signed by:  Maxwell Guerrero MD,Carrillo BAILEY on 22 1250     Narrative   Ordering Provider: [address] [place] [address]          Radiology Department  Patient:  Windy Maurer. :  1950   Sex: Geoffrey [facility]  Location:  [place] [phone]   Unit #:   [id number]       Acct #:  [de-identified]       Ordering Phys: Treva Howell DO            Exam Date: 22     Accession #:  [id number]     Exam:  US   US Neck Internal Jugualar     Result: See Report            **     ADDENDUM:    **     ====================     ADDENDUM     ====================     Please add the following addendum to report: There are pathologically enlarged lymph nodes measuring up to 2.1 cm in     short axis. The lymph nodes are partially anechoic suggesting cystic lymph     nodes or necrosis. Electronically Signed:     Nancy Mendoza MD     2022/12/15 at 10:54 EST     Reading Location ID and State: [address] [facility] / [place]     Tel [phone], Service support  [phone], Fax [phone]                                   Addendum Dictated by:  Nancy Mendoza MD on 12/15/22 at 1054     Transcribed by:  Nancy Mendoza MD on 12/15/22 at 1054          Report Signed by:  Nancy Mendoza MD on 12/15/22 1054                              Examination: Right internal jugular vein ultrasound          INDICATION: Right neck lump. Had port removed couple months ago. TECHNIQUE: Ultrasound of the right subclavian and internal jugular vein     was obtained. Images of the left internal jugular vein were obtained as     well. COMPARISON: CT of the neck dated 2022          FINDINGS:     There is an echogenic noncompressible focus within the right internal     jugular vein consistent with a thrombus. The right subclavian vein is     patent. The left internal jugular vein is patent as well.           IMPRESSION:     Thrombosed right internal jugular vein.          Electronically Signed:     Aftab Dowell MD     2022/12/01 at 12:55 EST     Reading Location ID and State: [address] [facility] / [place]     Tel [phone], Service support  [phone], Fax [phone]                         cc: Parviz Saenz DO; Keely Pizarro DO (Office)               Dictated by:  Aftab Dowell MD on 12/01/22 1255     Technologist:   Chitra Little     Transcribed by:  Aftab Dowell MD on 12/01/22 1255          Report Signed by: Wechsler MD,Elena on 12/01/22 1255     An electronic signature was used to authenticate this note.     --Felipa De La Garza MD

## 2022-12-16 NOTE — PROGRESS NOTES
Margie Pineda (:  1950)     ASSESSMENT:  1. Right greater than left cervical lymphadenopathy  2. CLL stage III  3. Large B-cell lymphoma  4. History right breast invasive carcinoma and DCIS  5. Recurrent small lymphocytic lymphoma  6. Thrombosed right internal jugular vein    PLAN:  1. Schedule Yuly for right cervical lymph node excisional biopsy. 2. She will undergo pre-operative clearance per anesthesia guidelines with risk factors listed under the past medical history diagnosis & problem list.  3. The risks, benefits and alternatives were discussed with Pallavi Hand including non-operative management. All questions answered. She understands and wishes to proceed with surgical intervention. 4. Restrictions discussed with Pallavi Hand and she expresses understanding. 5. She is advised to call back directly if there are further questions/concerns, or if her symptoms worsen prior to surgery. SUBJECTIVE/OBJECTIVE:    Chief Complaint   Patient presents with    Surgical Consult     New patient- referred by Dr. Boby Canavan - Right neck lymphadenopathy - Hx of chronic lymphocytic leukemia-requesting lymph node biopsy     HPI  Pallavi Hand is a 75-year-old female who presents for the need of a right cervical lymph node excisional biopsy. She has a history of right breast invasive carcinoma and DCIS. Also has a history of CLL stage III, large B-cell lymphoma stage III and recurrent small lymphocytic lymphoma. She follows with Dr. Boby Canavan of hematology/oncology. Recently found to have a thrombosed right internal jugular vein diagnosed earlier this month. Currently on Eliquis. Denies current chest pain or shortness of breath. No abdominal pain. No unexplained weight loss. Intermittent sweats. No fevers. Denies fatigue. Normal bowel function. No hematochezia or melena. No new urinary complaints. Recent imaging earlier this month demonstrated large right and smaller left cervical lymphadenopathy.   CT chest, abdomen and pelvis demonstrated enlarged bilateral mediastinal, hilar and retroperitoneal lymphadenopathy. Splenomegaly noted to be slightly larger. Patient in need of excisional biopsy for further diagnosis of new/worsening lymphadenopathy. Review of Systems   Constitutional:  Positive for diaphoresis. Negative for activity change, appetite change, chills, fatigue, fever and unexpected weight change. HENT:  Positive for tinnitus. Negative for congestion, dental problem, drooling, ear discharge, ear pain, facial swelling, hearing loss, mouth sores, nosebleeds, postnasal drip, rhinorrhea, sinus pressure, sneezing, sore throat, trouble swallowing and voice change. Eyes:  Negative for photophobia, pain, discharge, redness, itching and visual disturbance. Respiratory:  Negative for apnea, cough, choking, chest tightness, shortness of breath, wheezing and stridor. Cardiovascular:  Negative for chest pain, palpitations and leg swelling. Gastrointestinal:  Negative for abdominal distention, abdominal pain, anal bleeding, blood in stool, constipation, diarrhea, nausea, rectal pain and vomiting. Endocrine: Negative. Genitourinary:  Negative for decreased urine volume, difficulty urinating, dyspareunia, dysuria, enuresis, flank pain, frequency, genital sores, hematuria, menstrual problem, pelvic pain, urgency, vaginal bleeding, vaginal discharge and vaginal pain. Musculoskeletal:  Negative for arthralgias, back pain, gait problem, joint swelling, myalgias, neck pain and neck stiffness. Skin:  Negative for color change, pallor, rash and wound. Allergic/Immunologic: Negative. Neurological:  Negative for dizziness, tremors, seizures, syncope, facial asymmetry, speech difficulty, weakness, light-headedness, numbness and headaches. Hematological:  Positive for adenopathy. Does not bruise/bleed easily.    Psychiatric/Behavioral:  Negative for agitation, behavioral problems, confusion, decreased concentration, dysphoric mood, hallucinations, self-injury, sleep disturbance and suicidal ideas. The patient is not nervous/anxious and is not hyperactive. Past Medical History:   Diagnosis Date    Adenopathy     Cancer [facility]) ?2002    breast - right    CLL (chronic lymphocytic leukemia) ([facility] [address] ? 2005    CLL - Dr. Jeronimo Amato 01/2022    Depression     GERD (gastroesophageal reflux disease)     Hyperlipidemia     Hypertension     Hypothyroidism     Iron deficiency anemia     NHL (nodular histiocytic lymphoma) (H[facility])     Nonfamilial hypogammaglobulinemia (HCC)     Right jugular vein thrombosis     on Eliquis    Thyroid nodule        Past Surgical History:   Procedure Laterality Date    BONE MARROW BIOPSY  08/2017    BREAST LUMPECTOMY Right 2007    Dr Pricila Vila (CERVIX STATUS UNKNOWN)  1975    partial due to IUD    IR PORT PLACEMENT EQUAL OR GREATER THAN 5 YEARS  2017    Done in [facility] IR    MASTECTOMY Right 02/26/2019    Dr. Chica Ag  2007?    saliva stone removed    OTHER SURGICAL HISTORY  08/27/2010    left parotidectomy    OTHER SURGICAL HISTORY  03/2019    retroperitoneal LN biopsy    OTHER SURGICAL HISTORY  09/13/2022    port removal    CO NEEDLE BIOPSY, LYMPH NODE(S) Left 08/15/2017    EXCISION OF LEFT CERVICAL LYMPH NODE BIOPSY X 2 performed by Mitchell Khoury MD at [address]  (CERVIX REMOVED)  1985    B/L       Current Outpatient Medications   Medication Sig Dispense Refill    ELIQUIS 5 MG TABS tablet take 2 tablet by mouth twice a day for 7 days then 1 tablet twice a day for 30 DAYS      valsartan-hydroCHLOROthiazide (DIOVAN-HCT) 80-12.5 MG per tablet Take by mouth daily      omeprazole (PRILOSEC) 20 MG delayed release capsule       Vitamin D (CHOLECALCIFEROL) 1000 UNITS CAPS capsule Take 1,000 Units by mouth daily Takes Vitamin D in the winter months      Levothyroxine Sodium (SYNTHROID PO) Take 100 mcg by mouth daily Normocephalic and atraumatic. Right Ear: External ear normal.      Left Ear: External ear normal.      Nose: Nose normal.   Eyes:      General: No scleral icterus. Right eye: No discharge. Left eye: No discharge. Conjunctiva/sclera: Conjunctivae normal.   Cardiovascular:      Rate and Rhythm: Normal rate and regular rhythm. Heart sounds: Normal heart sounds. Pulmonary:      Effort: Pulmonary effort is normal. No respiratory distress. Breath sounds: Normal breath sounds. No wheezing or rales. Chest:      Chest wall: No tenderness. Abdominal:      General: Bowel sounds are normal. There is no distension. Palpations: Abdomen is soft. There is no mass. Tenderness: There is no abdominal tenderness. There is no guarding or rebound. Musculoskeletal:         General: No tenderness. Normal range of motion. Cervical back: Normal range of motion and neck supple. Lymphadenopathy:      Head:      Right side of head: No submental, submandibular, tonsillar, preauricular, posterior auricular or occipital adenopathy. Left side of head: No submental, submandibular, tonsillar, preauricular, posterior auricular or occipital adenopathy. Cervical: Cervical adenopathy present. Upper Body:      Right upper body: No supraclavicular, axillary or pectoral adenopathy. Left upper body: No supraclavicular, axillary or pectoral adenopathy. Lower Body: No right inguinal adenopathy. No left inguinal adenopathy. Skin:     General: Skin is warm and dry. Coloration: Skin is not pale. Findings: No erythema or rash. Neurological:      Mental Status: She is alert and oriented to person, place, and time. Cranial Nerves: No cranial nerve deficit. Psychiatric:         Behavior: Behavior normal.         Thought Content:  Thought content normal.         Judgment: Judgment normal.     Lab Results   Component Value Date    WBC 5.4 08/25/2022    HGB 11.4 (L) 2022    HCT 33.8 (L) 2022    MCV 78.9 (L) 2022     2022     Lab Results   Component Value Date     (L) 2022    K 3.7 2022    CL 99 (L) 2022    CO2 29 2022     Lab Results   Component Value Date    CREATININE 0.7 2022     Lab Results   Component Value Date    ALT 29 2022    AST 31 2022    ALKPHOS 72 2022    BILITOT 0.6 2022     No results found for: LIPASE    Patient Active Problem List   Diagnosis    Chronic lymphocytic leukemia of B-cell type not having achieved remission [facility])    Chemotherapy management, encounter for    Leukocytosis    Breast cancer, right breast [facility])     Narrative   Ordering Provider: [address] [place] [address]          Radiology Department  Patient:  Isela Maurer. :  1950   Sex: Amy, 89 [facility]  Location:  [phone]   Unit #:   [id number]       Acct #:  [de-identified]       Ordering Phys: Jes Medellin DO            Exam Date: 22     Accession #:  [id number]     Exam:  CT   CT Neck With Contrast [id number]     Result: See Report            **     ADDENDUM:    **     ====================     ADDENDUM     ====================     Right jugular lymphadenopathy at the level of the     floor the mouth measures 2.2 x 5.2 cm. Right jugular lymphadenopathy at     the level of the larynx measures 2.5 x 4.0 cm.   Right posterior triangle     lymphadenopathy collectively measures 4.0 x 4.5 cm to          Electronically Signed:     Keith Morrow MD     2022/12/15 at 11:11 EST     Reading Location ID and State: [address] [facility] [phone], Service support  [phone], Fax [phone]                                   Addendum Dictated by:  Joelle Vargas MD on 12/15/22 at 46     Transcribed by:  Joelle Vargas MD on 12/15/22 at 1111          Report Signed by:  Joelle Vargas MD on 12/15/22 1111 EXAM:  CT NECK WITH INTRAVENOUS CONTRAST          CLINICAL INDICATION:  Chronic lymphocytic leukemia of B-cell type. PT     STATES SHE HAD NON HODGKIN''S LYMPHOMA THEN BREAST CANCER 4 YEARS AGO. C/O     RIGHT SIDE LYMPH NODES SWELLING X 3 WEEKS. PT STATES SHE HAD HER PORT     REMOVED AND NOW HAS SWOLLEN LYMPH NODES. PT HAD AN US DONE LAST WEEK. HX     OF RIGHT MASTECTOMY, PAROTID GLAND AND LYMPH NODE IN BACK OF NECK REMOVED. TECHNIQUE:  Helically acquired images were obtained of the neck with     intravenous contrast.  This CT exam was performed using one or more of the     following dose reduction techniques:  automated exposure control,     adjustment of the mA and/or kV according to patient size, and/or use of     iterative reconstruction technique. This report was created using     [employer] report generation technology. CONTRAST:  IV Omnipaque 300 75 ML          COMPARISON:  None. FINDINGS:          NASOPHARYNX:  Normal.          SUPRAHYOID NECK:  Normal.  Oropharynx, oral cavity, parapharyngeal space     and retropharyngeal space are unremarkable. INFRAHYOID NECK:  Normal.  The larynx, hypopharynx and supraglottis are     unremarkable. SUBMANDIBULAR/PAROTID GLANDS:  Normal.  Glands are normal in size. THYROID:  Normal normal sized. 4 mm macrocalcification within the left     thyroid lobe. BONES/JOINTS:  No acute fracture. SOFT TISSUES:  Normal.          VASCULATURE:  No acute findings. LYMPH NODES:  Todd enlargement within the right side of the neck     measuring up to 4.5 cm in diameter involving levels through 7. Multiple     subcentimeter lymph nodes are noted on the left along multiple levels. Appearance is significantly changed from prior exam.          LUNG APICES:  Unremarkable as visualized.           IMPRESSION:          Large right and smaller left cervical lymph nodes consistent with     lymphoproliferative disorder. Electronically Signed:     Timothy Montoya MD      at 13:14 EST     Reading Location ID and State: [place] / [place]     Tel [phone], Service support  [phone], Fax [phone]                         cc: Peyman Desai DO; Toni Solis DO (Office)               Dictated by:  Dyan Harkins MD on 22 1314     Technologist:       Transcribed by:  Dyan Harkins MD on 22 1314          Report Signed by:  Carrillo Caicedo MD on 22 1314     Narrative   Ordering Provider: [address] [place] [address]          Radiology Department  Patient:  Macario Hagan 80. :  1950   Sex: Amy, Geoffrey [facility]  [facility]     [phone]   Unit #:   [id number]       Acct #:  [de-identified]       Ordering Phys: Peyman Desai DO            Exam Date: 22     Accession #:  [id number]     Exam:  CT   CT Abd/Pelvis W/Contrast [id number]     Result: See Report            **     ADDENDUM:    **     ====================     ADDENDUM     ====================     Right paratracheal lymphadenopathy measures 1.5 x 2.5     cm. Azygos lymphadenopathy measures 3.0 x 3.5 cm. Subcarinal     lymphadenopathy measures 2.0 x 3.0 cm. There are several small retroperitoneal and mesenteric lymph nodes. The     largest discrete lymph node is a mesenteric lymph node just superior to     the third portion of duodenum and measures 2 x 3 cm.           Electronically Signed:     Jeremy Sarabia MD     2022/12/15 at 11:20 EST     Reading Location ID and State: [address] / [address] [facility] [phone], Service support  [phone], Fax [phone]                                   Addendum Dictated by:  Patricia Joyner MD on 12/15/22 at 1120     Transcribed by:  Patricia Joyner MD on 12/15/22 at 1120          Report Signed by:  Patricia Joyner MD on 12/15/22 1120                              EXAM:  CT CHEST, ABDOMEN AND PELVIS WITH INTRAVENOUS CONTRAST CLINICAL INDICATION:  Chronic lymphocytic leukemia of B-cell type. PT     STATES SHE HAD NON HODGKIN''S LYMPHOMA THEN BREAST CANCER 4 YEARS AGO. C/O     RIGHT SIDE LYMPH NODES SWELLING X 3 WEEKS. PT STATES SHE HAD HER PORT     REMOVED AND NOW HAS SWOLLEN LYMPH NODES. PT HAD AN US DONE LAST WEEK. HX     OF RIGHT MASTECTOMY, PAROTID GLAND AND LYMPH NODE IN BACK OF NECK REMOVED. TECHNIQUE:  Helically acquired images were obtained of the chest, abdomen     and pelvis with intravenous contrast.  This CT exam was performed using     one or more of the following dose reduction techniques:  automated     exposure control, adjustment of the mA and/or kV according to patient     size, and/or use of iterative reconstruction technique. This report was     created using [employer] report generation technology. CONTRAST:  IV/Oral Omnipaque 300 75 ML          COMPARISON:  CT Chest Abdomen Pelvis dated 8/25/2022          FINDINGS:     CHEST:          LUNGS AND PLEURAL SPACES:  Calcification within the left lung adjacent to     the major fissure again seen suggestive of chronic inflammatory change. Similar stable changes of scarring noted within the right middle lobe. No     mass. No pleural effusion or thickening. No pneumothorax. HEART:  Normal.  Heart size is normal.  No pericardial effusion. MEDIASTINUM:  Large mediastinal and hilar lymph nodes indicative of     recurrent lymphoma/leukemia. Small hiatal hernia. Esophagus is     unremarkable. THYROID:  Normal.  No thyroid lesions. ABDOMEN:          LIVER:  Mild hepatic steatosis. GALLBLADDER AND BILE DUCTS:  Normal.  No calcified gallstones. No     gallbladder distention or wall edema. No intra- or extrahepatic biliary     ductal dilation. PANCREAS:  Normal.  No focal cystic or solid mass.           SPLEEN:  Spleen is enlarged now measuring 15.1 cm in cephalocaudad     dimension from prior measurement of 13.9 cm. ADRENALS:  Normal.  No nodules. KIDNEYS AND URETERS:  Stable bilateral renal cysts. Normal renal size and     position. No hydronephrosis. STOMACH AND BOWEL:  Mild stool burden within the large bowel and rectum. PELVIS:          APPENDIX:  No evidence of acute appendicitis. BLADDER:  Normal.          REPRODUCTIVE:  Uterus is absent. CHEST, ABDOMEN and PELVIS:          INTRAPERITONEAL SPACE:  Normal.  No ascites or other fluid collection. No     free air. BONES/JOINTS:  Normal.  No suspicious lytic or blastic abnormality. SOFT TISSUES:  Surgical changes of right mastectomy and maxillary node     dissection again seen. No discrete abdominal or pelvic wall hernia. VASCULATURE:  Normal.  Aorta is non-dilated. No aortic dissection. No     obvious central pulmonary embolism although this study was not performed     with the pulmonary embolism protocol. LYMPH NODES:  Adenopathy noted bilaterally along the cervical levels 5B     and 7which were not present on prior exam.  New retroperitoneal adenopathy     noted at the level of the celiac axis, hepatoduodenal ligament and     adjacent to the aorta and inferior vena cava. IMPRESSION:          1. Enlarged bilateral cervical, mediastinal, hilar and retroperitoneal     lymphadenopathy consistent with recurrent lymphoproliferative disease. 2.  Mild splenomegaly, mildly increased from prior exam.          3.  Hepatic steatosis          Electronically Signed:     Maxwell Guillen.  MD Lindsay     2022/12/09 at 12:50 EST     Reading Location ID and State: [address] [place] / [place]     Tel [phone], Service support  [phone], Fax [phone]                         cc: Beltran Barber DO; [facility] (Office)               Dictated by:  Isra Hu MD on 12/09/22 1250     Technologist:   RT(R)(CT) Sylvie Olvera     Transcribed by:  Isra Hu MD on 22 1250          Report Signed by:  Carrillo Fair MD on 22 1250     Narrative   Ordering Provider: [address] [place] [address]          Radiology Department  Patient:  Montana Maurer. :  1950   Sex: Amy, Geoffrey [facility]  Location:  [facility]     [phone]   Unit #:   [id number]       Acct #:  [de-identified]       Ordering Phys: Slime Ortiz DO            Exam Date: 22     Accession #:  [id number]     Exam:  US   US Neck Internal Jugualar     Result: See Report            **     ADDENDUM:    **     ====================     ADDENDUM     ====================     Please add the following addendum to report: There are pathologically enlarged lymph nodes measuring up to 2.1 cm in     short axis. The lymph nodes are partially anechoic suggesting cystic lymph     nodes or necrosis. Electronically Signed:     See Lopez MD     2022/12/15 at 10:54 EST     Reading Location ID and State: [address] [facility] / [place]     Tel [phone], Service support  [phone], Fax [phone]                                   Addendum Dictated by:  See Lopez MD on 12/15/22 at 1054     Transcribed by:  See Lopez MD on 12/15/22 at 1054          Report Signed by:  See Lopez MD on 12/15/22 1054                              Examination: Right internal jugular vein ultrasound          INDICATION: Right neck lump. Had port removed couple months ago. TECHNIQUE: Ultrasound of the right subclavian and internal jugular vein     was obtained. Images of the left internal jugular vein were obtained as     well. COMPARISON: CT of the neck dated 2022          FINDINGS:     There is an echogenic noncompressible focus within the right internal     jugular vein consistent with a thrombus. The right subclavian vein is     patent. The left internal jugular vein is patent as well.           IMPRESSION:     Thrombosed right internal jugular vein.          Electronically Signed:     Waldo Lee MD     2022/12/01 at 12:55 EST     Reading Location ID and State: [address] [facility] / [place]     Tel [phone], Service support  [phone], Fax [phone]                         cc: Carson Borjas DO; Nicol Mendoza DO (Office)               Dictated by:  Waldo Lee MD on 12/01/22 1255     Technologist:   Casey Mart     Transcribed by:  Waldo Lee MD on 12/01/22 1255          Report Signed by: Wechsler MD,Elena on 12/01/22 1255     An electronic signature was used to authenticate this note.     --Annalise Chauhan MD

## 2022-12-20 ENCOUNTER — PREP FOR PROCEDURE (OUTPATIENT)
Dept: SURGERY | Age: 72
End: 2022-12-20

## 2022-12-20 RX ORDER — SODIUM CHLORIDE 9 MG/ML
INJECTION, SOLUTION INTRAVENOUS CONTINUOUS
Status: CANCELLED | OUTPATIENT
Start: 2022-12-20

## 2022-12-22 ENCOUNTER — ANESTHESIA EVENT (OUTPATIENT)
Dept: OPERATING ROOM | Age: 72
End: 2022-12-22
Payer: MEDICARE

## 2022-12-22 ENCOUNTER — HOSPITAL ENCOUNTER (OUTPATIENT)
Age: 72
Setting detail: OUTPATIENT SURGERY
Discharge: HOME | End: 2022-12-22
Attending: SURGERY | Admitting: SURGERY
Payer: MEDICARE

## 2022-12-22 ENCOUNTER — ANESTHESIA (OUTPATIENT)
Dept: OPERATING ROOM | Age: 72
End: 2022-12-22
Payer: MEDICARE

## 2022-12-22 VITALS
WEIGHT: 209.2 LBS | HEART RATE: 82 BPM | TEMPERATURE: 99.2 F | SYSTOLIC BLOOD PRESSURE: 121 MMHG | RESPIRATION RATE: 20 BRPM | DIASTOLIC BLOOD PRESSURE: 67 MMHG | OXYGEN SATURATION: 96 % | HEIGHT: 66 IN | BODY MASS INDEX: 33.62 KG/M2

## 2022-12-22 DIAGNOSIS — R59.0 CERVICAL LYMPHADENOPATHY: ICD-10-CM

## 2022-12-22 LAB
APTT: 27.6 SECONDS (ref 22–38)
INR BLD: 1.06 (ref 0.85–1.13)
POTASSIUM SERPL-SCNC: 3.8 MEQ/L (ref 3.5–5.2)

## 2022-12-22 PROCEDURE — 88341 IMHCHEM/IMCYTCHM EA ADD ANTB: CPT

## 2022-12-22 PROCEDURE — 3600000002 HC SURGERY LEVEL 2 BASE: Performed by: SURGERY

## 2022-12-22 PROCEDURE — 85610 PROTHROMBIN TIME: CPT

## 2022-12-22 PROCEDURE — 2709999900 HC NON-CHARGEABLE SUPPLY: Performed by: SURGERY

## 2022-12-22 PROCEDURE — 88365 INSITU HYBRIDIZATION (FISH): CPT

## 2022-12-22 PROCEDURE — 85730 THROMBOPLASTIN TIME PARTIAL: CPT

## 2022-12-22 PROCEDURE — 6360000002 HC RX W HCPCS

## 2022-12-22 PROCEDURE — 3600000012 HC SURGERY LEVEL 2 ADDTL 15MIN: Performed by: SURGERY

## 2022-12-22 PROCEDURE — 88342 IMHCHEM/IMCYTCHM 1ST ANTB: CPT

## 2022-12-22 PROCEDURE — 36415 COLL VENOUS BLD VENIPUNCTURE: CPT

## 2022-12-22 PROCEDURE — 3700000000 HC ANESTHESIA ATTENDED CARE: Performed by: SURGERY

## 2022-12-22 PROCEDURE — 88366 INSITU HYBRIDIZATION (FISH): CPT

## 2022-12-22 PROCEDURE — 7100000010 HC PHASE II RECOVERY - FIRST 15 MIN: Performed by: SURGERY

## 2022-12-22 PROCEDURE — 2500000003 HC RX 250 WO HCPCS: Performed by: SURGERY

## 2022-12-22 PROCEDURE — 2580000003 HC RX 258

## 2022-12-22 PROCEDURE — 88185 FLOWCYTOMETRY/TC ADD-ON: CPT

## 2022-12-22 PROCEDURE — 88305 TISSUE EXAM BY PATHOLOGIST: CPT

## 2022-12-22 PROCEDURE — 88360 TUMOR IMMUNOHISTOCHEM/MANUAL: CPT

## 2022-12-22 PROCEDURE — 6360000002 HC RX W HCPCS: Performed by: NURSE ANESTHETIST, CERTIFIED REGISTERED

## 2022-12-22 PROCEDURE — 3700000001 HC ADD 15 MINUTES (ANESTHESIA): Performed by: SURGERY

## 2022-12-22 PROCEDURE — 88184 FLOWCYTOMETRY/ TC 1 MARKER: CPT

## 2022-12-22 PROCEDURE — 84132 ASSAY OF SERUM POTASSIUM: CPT

## 2022-12-22 PROCEDURE — 7100000011 HC PHASE II RECOVERY - ADDTL 15 MIN: Performed by: SURGERY

## 2022-12-22 RX ORDER — OXYCODONE HYDROCHLORIDE 5 MG/1
10 TABLET ORAL EVERY 4 HOURS PRN
Status: CANCELLED | OUTPATIENT
Start: 2022-12-22

## 2022-12-22 RX ORDER — MORPHINE SULFATE 2 MG/ML
4 INJECTION, SOLUTION INTRAMUSCULAR; INTRAVENOUS
Status: CANCELLED | OUTPATIENT
Start: 2022-12-22

## 2022-12-22 RX ORDER — PROPOFOL 10 MG/ML
INJECTION, EMULSION INTRAVENOUS CONTINUOUS PRN
Status: DISCONTINUED | OUTPATIENT
Start: 2022-12-22 | End: 2022-12-22 | Stop reason: SDUPTHER

## 2022-12-22 RX ORDER — SODIUM CHLORIDE 9 MG/ML
INJECTION, SOLUTION INTRAVENOUS PRN
Status: CANCELLED | OUTPATIENT
Start: 2022-12-22

## 2022-12-22 RX ORDER — HYDROCODONE BITARTRATE AND ACETAMINOPHEN 5; 325 MG/1; MG/1
1-2 TABLET ORAL EVERY 6 HOURS PRN
Qty: 15 TABLET | Refills: 0 | Status: SHIPPED | OUTPATIENT
Start: 2022-12-22 | End: 2022-12-25

## 2022-12-22 RX ORDER — FENTANYL CITRATE 50 UG/ML
INJECTION, SOLUTION INTRAMUSCULAR; INTRAVENOUS PRN
Status: DISCONTINUED | OUTPATIENT
Start: 2022-12-22 | End: 2022-12-22 | Stop reason: SDUPTHER

## 2022-12-22 RX ORDER — LIDOCAINE HYDROCHLORIDE AND EPINEPHRINE 20; 5 MG/ML; UG/ML
INJECTION, SOLUTION EPIDURAL; INFILTRATION; INTRACAUDAL; PERINEURAL PRN
Status: DISCONTINUED | OUTPATIENT
Start: 2022-12-22 | End: 2022-12-22 | Stop reason: ALTCHOICE

## 2022-12-22 RX ORDER — SODIUM CHLORIDE 9 MG/ML
INJECTION, SOLUTION INTRAVENOUS CONTINUOUS
Status: DISCONTINUED | OUTPATIENT
Start: 2022-12-22 | End: 2022-12-22 | Stop reason: HOSPADM

## 2022-12-22 RX ORDER — MORPHINE SULFATE 2 MG/ML
2 INJECTION, SOLUTION INTRAMUSCULAR; INTRAVENOUS
Status: CANCELLED | OUTPATIENT
Start: 2022-12-22

## 2022-12-22 RX ORDER — ONDANSETRON 4 MG/1
4 TABLET, ORALLY DISINTEGRATING ORAL EVERY 8 HOURS PRN
Status: CANCELLED | OUTPATIENT
Start: 2022-12-22

## 2022-12-22 RX ORDER — PROPOFOL 10 MG/ML
INJECTION, EMULSION INTRAVENOUS PRN
Status: DISCONTINUED | OUTPATIENT
Start: 2022-12-22 | End: 2022-12-22 | Stop reason: SDUPTHER

## 2022-12-22 RX ORDER — OXYCODONE HYDROCHLORIDE 5 MG/1
5 TABLET ORAL EVERY 4 HOURS PRN
Status: CANCELLED | OUTPATIENT
Start: 2022-12-22

## 2022-12-22 RX ORDER — SODIUM CHLORIDE 0.9 % (FLUSH) 0.9 %
5-40 SYRINGE (ML) INJECTION PRN
Status: CANCELLED | OUTPATIENT
Start: 2022-12-22

## 2022-12-22 RX ORDER — ONDANSETRON 2 MG/ML
4 INJECTION INTRAMUSCULAR; INTRAVENOUS EVERY 6 HOURS PRN
Status: CANCELLED | OUTPATIENT
Start: 2022-12-22

## 2022-12-22 RX ORDER — SODIUM CHLORIDE 0.9 % (FLUSH) 0.9 %
5-40 SYRINGE (ML) INJECTION EVERY 12 HOURS SCHEDULED
Status: CANCELLED | OUTPATIENT
Start: 2022-12-22

## 2022-12-22 RX ADMIN — PROPOFOL 75 MCG/KG/MIN: 10 INJECTION, EMULSION INTRAVENOUS at 11:11

## 2022-12-22 RX ADMIN — CEFAZOLIN 2000 MG: 10 INJECTION, POWDER, FOR SOLUTION INTRAVENOUS at 11:13

## 2022-12-22 RX ADMIN — FENTANYL CITRATE 100 MCG: 50 INJECTION, SOLUTION INTRAMUSCULAR; INTRAVENOUS at 11:11

## 2022-12-22 RX ADMIN — PROPOFOL 50 MG: 10 INJECTION, EMULSION INTRAVENOUS at 11:11

## 2022-12-22 RX ADMIN — SODIUM CHLORIDE: 9 INJECTION, SOLUTION INTRAVENOUS at 10:33

## 2022-12-22 ASSESSMENT — PAIN SCALES - GENERAL
PAINLEVEL_OUTOF10: 0
PAINLEVEL_OUTOF10: 0

## 2022-12-22 NOTE — PROGRESS NOTES
1148  pt. Awake and oriented on arrival to phase II. Pt. Denies pain. Right neck surgical glue intact and dry. Family at bedside. Call light in reach. Pt. Rubbing right eye. Pt. Encouraged not to rub eye. Right eye red and pt. States it feels like something is in there. 1210  pt. Taking liquids without difficulty. 1230  dr. Ana Austin updated on red eye. Saline drops instilled. 1245  pt. States eye feels much better. 1250  discharge instructions given. Pt. Shakeel Martinez understanding. 1300  phase II criteria met. Pt. Discharged per wheelchair. Pt. Stable and tolerated well. Discharge instructions and personal belongings with  pt.

## 2022-12-22 NOTE — OP NOTE
[address] [place] [address]                                OPERATIVE REPORT    PATIENT NAME: Stacy Torres                      :        1950  MED REC NO:   [id number]                           ROOM:  ACCOUNT NO:   [de-identified]                           ADMIT DATE: 2022  PROVIDER:     Darlin Hall M.D.    Sidney Bookbinder OF PROCEDURE:  2022    PREOPERATIVE DIAGNOSES:  1. Right greater than left cervical lymphadenopathy. 2.  CLL stage III. 3.  Large B-cell lymphoma. 4.  History right breast invasive carcinoma and DCIS. 5.  Recurrent small lymphocytic lymphoma. 6.  Thrombosed right internal jugular vein. POSTOPERATIVE DIAGNOSES:  1. Right greater than left cervical lymphadenopathy. 2.  CLL stage III. 3.  Large B-cell lymphoma. 4.  History right breast invasive carcinoma and DCIS. 5.  Recurrent small lymphocytic lymphoma. 6.  Thrombosed right internal jugular vein. PROCEDURE:  Right cervical lymph node excisional biopsy (level 5). SURGEON:  Darlin Hall MD    ASSISTANT:  BRITNI ABREU    ANESTHESIA:  IV sedation/local.    ESTIMATED BLOOD LOSS: 2 mL. DRAINS:  None. COMPLICATIONS:  None. DISPOSITION:  Stable to the recovery room. INDICATIONS:  The patient is a 57-year-old female who has right greater  than left cervical lymphadenopathy. , long history of CLL stage III,  large B-cell lymphoma, small lymphocytic lymphoma. She was in need of  tissue diagnosis. Both operative and nonoperative intervention plans  were discussed. Risks of surgery were further discussed. Some of the  risks included but were not limited to bleeding, infection, the need for  reoperation, severe chronic postoperative pain or numbness, major  vascular or nerve injury, cardiopulmonary complications, anesthetic  complications, seroma-hematoma formation, wound breakdown, chronic pain  and death.   After all of the questions were answered in their entirety  and the patient was completely aware of current situation, she wished to  proceed with surgery. DESCRIPTION OF THE PROCEDURE:  After informed consent was signed and  placed on the chart, the patient was taken back to the operating room,  placed supine on the operating room table. IV sedation was  administered. She tolerated this throughout the case. All pressure  points padded. She was on preoperative antibiotics. Bilateral lower  extremity sequential compression devices were placed prior to incision. Her neck was turned to the left. The right neck and upper chest were  all prepped and draped in usual sterile standard fashion. A time-out  occurred prior to the operation which not only identified the patient  but also the planned procedure to be performed. At the end of the  time-out, there were no questions or concerns. I began the operation  first by locally anesthetizing the patient. She tolerated the local  anesthetic well. Incision was then made directly over the area of  concern in the level 5 cervical lymph node region that was marked with  the patient in same day surgery preoperatively. This deepened to the  deep dermal subcutaneous tissues. Platysma muscle dissected through. Into the deeper tissues, the lymph node was identified. This was  dissected free. This was excised and then sent to Pathology for  permanent. Irrigation. Hemostasis adequate. Deep tissues were  approximated with interrupted 3-0 Vicryl suture. Deep dermal tissues  were reapproximated with interrupted 3-0 Vicryl suture. Skin  reapproximated with a running 4-0 Vicryl in a subcuticular fashion. Closed incision was then cleaned, dried and Steri-Strips applied. Dry  sterile dressings applied. Sponge, needle and instrumentation count was  correct at the end of the procedure.   The patient tolerated the  procedure well with no apparent complications and only about 2 mL of  blood loss. She was brought back from IV sedation and transferred to  postanesthesia care unit in stable condition.         Ron Palm M.D.    D: 12/22/2022 11:53:11       T: 12/22/2022 11:55:38     [email or web address]  Job#: [id number]     Doc#: [id number]    CC:

## 2022-12-22 NOTE — INTERVAL H&P NOTE
Update History & Physical    The patient's History and Physical was reviewed with the patient and I examined the patient. There was no change. The surgical site was confirmed by the patient and me.     Plan: The risks, benefits, expected outcome, and alternative to the recommended procedure have been discussed with the patient. Patient understands and wants to proceed with the procedure.     Electronically signed by Luke Rios MD on 12/22/2022 at 10:43 AM

## 2022-12-22 NOTE — BRIEF OP NOTE
Brief Postoperative Note      Patient: Yina Mariee  YOB: 1950  MRN: [id number]    Date of Procedure: 12/22/2022    Pre-Op Diagnosis: Right Cervical lymphadenopathy [R59.0]    Post-Op Diagnosis: Same       Procedure(s):  Excisional Biopsy Right Cervical Lymph Node (level V)    Surgeon(s):  Tanesha Kapadia MD    Assistant:  First Assistant: Kimberly Bray RN    Anesthesia: Monitor Anesthesia Care/local    Estimated Blood Loss (mL): 2ml    Complications: None    Specimens:   ID Type Source Tests Collected by Time Destination   A : right cervical lymph node Tissue Lymph Node SURGICAL PATHOLOGY Tanesha Kapadia MD 12/22/2022 1126        Implants:  * No implants in log *      Drains: * No LDAs found *    Findings: as above - see op note for details    Electronically signed by Tanesha Kapadia MD on 12/22/2022 at 11:45 AM

## 2022-12-22 NOTE — PROGRESS NOTES
Patient admitted to [facility] room 1 with  at bedside. Bed in low position side rails up call light in reach. Patient denies questions at this time.

## 2022-12-22 NOTE — ANESTHESIA PRE PROCEDURE
Department of Anesthesiology  Preprocedure Note       Name:  Emilie Perales   Age:  74 y.o.  :  1950                                          MRN:  [id number]         Date:  2022      Surgeon: Jose Mendoza):  Kimo Mitchell MD    Procedure: Procedure(s):  Excisional Biopsy Right Cervical Lymph Node    Medications prior to admission:   Prior to Admission medications    Medication Sig Start Date End Date Taking? Authorizing Provider   ELIQUIS 5 MG TABS tablet take 2 tablet by mouth twice a day for 7 days then 1 tablet twice a day for 30 DAYS 22   Historical Provider, MD   valsartan-hydroCHLOROthiazide (DIOVAN-HCT) 80-12.5 MG per tablet Take by mouth daily 20   Historical Provider, MD   omeprazole (PRILOSEC) 20 MG delayed release capsule  10/6/22   Historical Provider, MD   Vitamin D (CHOLECALCIFEROL) 1000 UNITS CAPS capsule Take 1,000 Units by mouth daily Takes Vitamin D in the winter months    Historical Provider, MD   Levothyroxine Sodium (SYNTHROID PO) Take 100 mcg by mouth daily     Historical Provider, MD   pravastatin (PRAVACHOL) 40 MG tablet Take 40 mg by mouth daily. Historical Provider, MD       Current medications:    Current Facility-Administered Medications   Medication Dose Route Frequency Provider Last Rate Last Admin    0.9 % sodium chloride infusion   IntraVENous Continuous Kadie Vann  mL/hr at 00/10/59 1033 New Bag at 22 1033    ceFAZolin (ANCEF) 2000 mg in dextrose 5 % 50 mL IVPB  2,000 mg IntraVENous 30 Min Pre-Op Kadie Vann LPN           Allergies:     Allergies   Allergen Reactions    Zithromax [Azithromycin]      hives       Problem List:    Patient Active Problem List   Diagnosis Code    Chronic lymphocytic leukemia of B-cell type not having achieved remission (HCC) C91.10    Chemotherapy management, encounter for Z51.11    Leukocytosis D72.829    Breast cancer, right breast ([facility] [address] C50.911       Past Medical History:        Diagnosis Date  Adenopathy     Cancer [facility]) ?    breast - right    CLL (chronic lymphocytic leukemia) ([facility] [address] ?     CLL - Dr. Erendira ROBLEROID 2022    Depression     GERD (gastroesophageal reflux disease)     Hyperlipidemia     Hypertension     Hypothyroidism     Iron deficiency anemia     NHL (nodular histiocytic lymphoma) (HCC)     Nonfamilial hypogammaglobulinemia ([facility] [address]     Right jugular vein thrombosis     on Eliquis    Thyroid nodule        Past Surgical History:        Procedure Laterality Date    BONE MARROW BIOPSY  2017    BREAST LUMPECTOMY Right     Dr Soraida Madrigal (CERVIX STATUS UNKNOWN)  1975    partial due to IUD    IR PORT PLACEMENT EQUAL OR GREATER THAN 5 YEARS  2017    Done in [facility] IR    MASTECTOMY Right 2019    Dr. Jazz Grigsby  ?    saliva stone removed    OTHER SURGICAL HISTORY  2010    left parotidectomy    OTHER SURGICAL HISTORY  2019    retroperitoneal LN biopsy    OTHER SURGICAL HISTORY  2022    port removal    TN NEEDLE BIOPSY, LYMPH NODE(S) Left 08/15/2017    EXCISION OF LEFT CERVICAL LYMPH NODE BIOPSY X 2 performed by Goran Sam MD at [address] [place] [address] (CERVIX REMOVED)  1985    B/L       Social History:    Social History     Tobacco Use    Smoking status: Former     Packs/day: 0.50     Years: 10.00     Pack years: 5.00     Types: Cigarettes     Quit date: 1995     Years since quittin.9    Smokeless tobacco: Never   Substance Use Topics    Alcohol use:  No                                Counseling given: Not Answered      Vital Signs (Current):   Vitals:    22 1005   BP: (!) 142/66   Pulse: 92   Resp: 16   Temp: 97.7 °F (36.5 °C)   TempSrc: Temporal   SpO2: 94%   Weight: 209 lb 3.2 oz (94.9 kg)   Height: 5' 6\" (1.676 m)                                              BP Readings from Last 3 Encounters:   22 (!) 142/66   22 120/80 09/13/22 128/68       NPO Status: Time of last liquid consumption: 2300                        Time of last solid consumption: 2300                        Date of last liquid consumption: 12/21/22                        Date of last solid food consumption: 12/21/22    BMI:   Wt Readings from Last 3 Encounters:   12/22/22 209 lb 3.2 oz (94.9 kg)   12/16/22 206 lb 3.2 oz (93.5 kg)   09/13/22 200 lb (90.7 kg)     Body mass index is 33.77 kg/m². CBC:   Lab Results   Component Value Date/Time    WBC 5.4 08/25/2022 09:25 AM    RBC 4.29 08/25/2022 09:25 AM    RBC 4.75 03/30/2012 10:12 AM    HGB 11.4 08/25/2022 09:25 AM    HCT 33.8 08/25/2022 09:25 AM    HCT 41.2 03/30/2012 10:12 AM    MCV 78.9 08/25/2022 09:25 AM    RDW 18.1 08/25/2022 09:25 AM     08/25/2022 09:25 AM     03/30/2012 10:12 AM       CMP:   Lab Results   Component Value Date/Time     08/25/2022 09:25 AM    K 3.8 12/22/2022 10:10 AM    CL 99 08/25/2022 09:25 AM    CO2 29 08/25/2022 09:25 AM    BUN 12 08/25/2022 09:25 AM    CREATININE 0.7 08/25/2022 09:31 AM    AGRATIO 1.4 08/25/2022 09:25 AM    LABGLOM >90 06/04/2019 09:13 AM    GLUCOSE 122 08/25/2022 09:25 AM    PROT 7.1 08/25/2022 09:25 AM    CALCIUM 9.60 08/25/2022 09:25 AM    BILITOT 0.6 08/25/2022 09:25 AM    ALKPHOS 72 08/25/2022 09:25 AM    AST 31 08/25/2022 09:25 AM    ALT 29 08/25/2022 09:25 AM       POC Tests: No results for input(s): POCGLU, POCNA, POCK, POCCL, POCBUN, POCHEMO, POCHCT in the last 72 hours.     Coags:   Lab Results   Component Value Date/Time    INR 1.06 12/22/2022 10:10 AM    APTT 27.6 12/22/2022 10:10 AM       HCG (If Applicable): No results found for: PREGTESTUR, PREGSERUM, HCG, HCGQUANT     ABGs: No results found for: PHART, PO2ART, YCP2KAW, LQJ8ZPJ, BEART, B1CNTVTQ     Type & Screen (If Applicable):  No results found for: LABABO, LABRH    Drug/Infectious Status (If Applicable):  No results found for: HIV, HEPCAB    COVID-19 Screening (If Applicable): Lab Results   Component Value Date/Time    COVID19 DETECTED 01/25/2022 10:41 AM           Anesthesia Evaluation   no history of anesthetic complications:   Airway: Mallampati: II  TM distance: >3 FB   Neck ROM: full  Mouth opening: > = 3 FB   Dental:          Pulmonary:normal exam              Patient did not smoke on day of surgery. Cardiovascular:    (+) hypertension:,                   Neuro/Psych:   (+) psychiatric history:            GI/Hepatic/Renal:   (+) GERD:,           Endo/Other:    (+) hypothyroidism::., .          Pt had no PAT visit       Abdominal:             Vascular: negative vascular ROS. Other Findings:           Anesthesia Plan      MAC     ASA 3       Induction: intravenous. Anesthetic plan and risks discussed with patient. Plan discussed with CRNA.                     David Espana MD   12/22/2022

## 2022-12-22 NOTE — ANESTHESIA POSTPROCEDURE EVALUATION
Department of Anesthesiology  Postprocedure Note    Patient: Dashawn Thornton  MRN: [id number]  YOB: 1950  Date of evaluation: 2022      Procedure Summary     Date: 22 Room / Location: [facility]    Anesthesia Start: 1108 Anesthesia Stop: 2664    Procedure: Excisional Biopsy Right Cervical Lymph Node (Right) Diagnosis:       Cervical lymphadenopathy      (Cervical lymphadenopathy [R59.0])    Surgeons: Janace Lesches, MD Responsible Provider: eNil García MD    Anesthesia Type: MAC ASA Status: 3          Anesthesia Type: No value filed.     Jose Phase I: Jose Score: 10    Jose Phase II:        Anesthesia Post Evaluation    Patient location during evaluation: bedside  Patient participation: complete - patient participated  Level of consciousness: awake  Pain score: 0  Airway patency: patent  Nausea & Vomiting: no nausea and no vomiting  Complications: no  Cardiovascular status: hemodynamically stable  Respiratory status: acceptable  Hydration status: euvolemic

## 2022-12-26 ENCOUNTER — HOSPITAL ENCOUNTER (EMERGENCY)
Age: 72
Discharge: HOME | End: 2022-12-26
Payer: MEDICARE

## 2022-12-26 VITALS
DIASTOLIC BLOOD PRESSURE: 78 MMHG | OXYGEN SATURATION: 99 % | HEART RATE: 99 BPM | TEMPERATURE: 97.6 F | SYSTOLIC BLOOD PRESSURE: 99 MMHG | RESPIRATION RATE: 18 BRPM

## 2022-12-26 DIAGNOSIS — B02.9 HERPES ZOSTER WITHOUT COMPLICATION: Primary | ICD-10-CM

## 2022-12-26 LAB — LEUKEMIA/LYMPHOMA PHENOTYPING MISC: NORMAL

## 2022-12-26 PROCEDURE — 99213 OFFICE O/P EST LOW 20 MIN: CPT

## 2022-12-26 RX ORDER — ACYCLOVIR 800 MG/1
800 TABLET ORAL
Qty: 35 TABLET | Refills: 0 | Status: SHIPPED | OUTPATIENT
Start: 2022-12-26 | End: 2023-01-02

## 2022-12-26 ASSESSMENT — PAIN - FUNCTIONAL ASSESSMENT
PAIN_FUNCTIONAL_ASSESSMENT: ACTIVITIES ARE NOT PREVENTED
PAIN_FUNCTIONAL_ASSESSMENT: 0-10

## 2022-12-26 ASSESSMENT — ENCOUNTER SYMPTOMS
EYE REDNESS: 0
DIARRHEA: 0
SHORTNESS OF BREATH: 0
COUGH: 0
NAUSEA: 0
VOMITING: 0
ABDOMINAL PAIN: 0
EYE ITCHING: 0

## 2022-12-26 ASSESSMENT — PAIN SCALES - GENERAL: PAINLEVEL_OUTOF10: 7

## 2022-12-26 ASSESSMENT — PAIN DESCRIPTION - ORIENTATION: ORIENTATION: LEFT;RIGHT

## 2022-12-26 ASSESSMENT — PAIN DESCRIPTION - LOCATION: LOCATION: ARM;BUTTOCKS;LEG

## 2022-12-26 ASSESSMENT — LIFESTYLE VARIABLES: HOW OFTEN DO YOU HAVE A DRINK CONTAINING ALCOHOL: NEVER

## 2022-12-26 ASSESSMENT — PAIN DESCRIPTION - FREQUENCY: FREQUENCY: CONTINUOUS

## 2022-12-26 ASSESSMENT — PAIN DESCRIPTION - DESCRIPTORS: DESCRIPTORS: ACHING;BURNING

## 2022-12-26 NOTE — ED PROVIDER NOTES
[address] Encounter      CHIEFCOMPLAINT       Chief Complaint   Patient presents with    Herpes Zoster         Nurses Notes reviewed and I agree except as noted in the HPI. HISTORY OF PRESENT ILLNESS   Jinny Hinojosa is a 74 y.o. female who presents to the urgent care for evaluation. She is concerned that she may have shingles started a week and a half ago. Rash os down her the backside of her right leg. She has had shingles in the past.   Painful. The patient/patient representative has no other acute complaints at this time. REVIEW OF SYSTEMS     Review of Systems   Constitutional:  Negative for chills, fatigue and fever. Eyes:  Negative for redness and itching. Respiratory:  Negative for cough and shortness of breath. Cardiovascular:  Negative for chest pain. Gastrointestinal:  Negative for abdominal pain, diarrhea, nausea and vomiting. Skin:  Positive for rash. Allergic/Immunologic: Negative for environmental allergies and food allergies. Neurological:  Negative for headaches. PAST MEDICAL HISTORY         Diagnosis Date    Adenopathy     Cancer [facility]) ?2002    breast - right    CLL (chronic lymphocytic leukemia) ([facility] [address] ? 2005    CLL - Dr. Antonieta Fall 01/2022    Depression     GERD (gastroesophageal reflux disease)     Hyperlipidemia     Hypertension     Hypothyroidism     Iron deficiency anemia     NHL (nodular histiocytic lymphoma) (HCC)     Nonfamilial hypogammaglobulinemia (HCC)     Right jugular vein thrombosis     on Eliquis    Thyroid nodule        SURGICAL HISTORY     Patient  has a past surgical history that includes Carpal tunnel release (Right); Total abdominal hysterectomy w/ bilateral salpingoophorectomy (1985); Breast lumpectomy (Right, 2007); other surgical history (2007?); Hysterectomy (1975); pr needle biopsy, lymph node(s) (Left, 08/15/2017); bone marrow biopsy (08/2017); other surgical history (08/27/2010);  Mastectomy (Right, 02/26/2019); other surgical history (03/2019); IR PORT PLACEMENT > 5 YEARS (2017); other surgical history (09/13/2022); and lymph node biopsy (Right, 12/22/2022). CURRENT MEDICATIONS       Previous Medications    ELIQUIS 5 MG TABS TABLET    take 2 tablet by mouth twice a day for 7 days then 1 tablet twice a day for 30 DAYS    LEVOTHYROXINE SODIUM (SYNTHROID PO)    Take 100 mcg by mouth daily     OMEPRAZOLE (PRILOSEC) 20 MG DELAYED RELEASE CAPSULE        PRAVASTATIN (PRAVACHOL) 40 MG TABLET    Take 40 mg by mouth daily. VALSARTAN-HYDROCHLOROTHIAZIDE (DIOVAN-HCT) 80-12.5 MG PER TABLET    Take by mouth daily    VITAMIN D (CHOLECALCIFEROL) 1000 UNITS CAPS CAPSULE    Take 1,000 Units by mouth daily Takes Vitamin D in the winter months       ALLERGIES     Patient is is allergic to zithromax [azithromycin]. FAMILY HISTORY     Patient'sfamily history includes Cancer in her paternal uncle and another family member; Heart Disease in her father. SOCIAL HISTORY     Patient  reports that she quit smoking about 28 years ago. Her smoking use included cigarettes. She has a 5.00 pack-year smoking history. She has never used smokeless tobacco. She reports that she does not drink alcohol and does not use drugs. PHYSICAL EXAM     ED TRIAGE VITALS  BP: 99/78, Temp: 97.6 °F (36.4 °C), Heart Rate: 99, Resp: 18, SpO2: 99 %  Physical Exam  Vitals and nursing note reviewed. Constitutional:       General: She is not in acute distress. Appearance: Normal appearance. She is well-developed and well-groomed. HENT:      Head: Normocephalic and atraumatic. Right Ear: External ear normal.      Left Ear: External ear normal.      Mouth/Throat:      Lips: Pink. Mouth: Mucous membranes are moist.   Eyes:      Conjunctiva/sclera: Conjunctivae normal.      Right eye: Right conjunctiva is not injected. Left eye: Left conjunctiva is not injected.       Pupils: Pupils are equal.   Cardiovascular:      Rate and Rhythm: Normal rate. Heart sounds: Normal heart sounds. Pulmonary:      Effort: Pulmonary effort is normal. No respiratory distress. Breath sounds: Normal breath sounds. Musculoskeletal:      Cervical back: Normal range of motion. Skin:     General: Skin is warm and dry. Findings: Rash (consistnet with shingles back of right leg) present. Neurological:      Mental Status: She is alert and oriented to person, place, and time. Psychiatric:         Mood and Affect: Mood normal.         Speech: Speech normal.         Behavior: Behavior is cooperative. DIAGNOSTIC RESULTS   Labs:  Abnormal Labs Reviewed - No data to display     IMAGING:  No orders to display     URGENT CARE COURSE:     Vitals:    12/26/22 1257   BP: 99/78   Pulse: 99   Resp: 18   Temp: 97.6 °F (36.4 °C)   TempSrc: Tympanic   SpO2: 99%       Medications - No data to display  PROCEDURES:  FINALIMPRESSION      1. Herpes zoster without complication        DISPOSITION/PLAN   DISPOSITION Decision To Discharge 12/26/2022 01:05:36 PM             Problem List Items Addressed This Visit    None  Visit Diagnoses       Herpes zoster without complication    -  Primary    Relevant Medications    acyclovir (ZOVIRAX) 800 MG tablet            Physical assessment findings, diagnostic testing(s) if applicable, and vital signs reviewed with patient/patient representative. Differential diagnosis(s) discussed with patient/patient representative. Prescription medications and/or over-the-counter medications for symptom management discussed. Patient is to follow-up with family care provider in 2-3 days if no improvement. If symptoms should worsen or change, go to the ED. Patient/patient representative is aware of care plan, questions answered, verbalizes understanding and is in agreement. Printed instructions attached to after visit summary.   If COVID-19 positive or COVID-19 by PCR is pending at time of discharge patient is to quarantine/isolate according to [facility] guidelines. PATIENT REFERRED TO:  Genesis Arevalo., DO  [address]  [phone]    Schedule an appointment as soon as possible for a visit in 2 days  For further evaluation. , If symptoms change/worsen, go to the [facility], APRN - CNP    Please note that some or all of this chart was generated using [employer] voice recognition software. Although every effort was made to ensure the accuracy of this automated transcription, some errors in transcription may have occurred.         HARLAN Cano CNP  12/26/22 5634

## 2022-12-26 NOTE — ED NOTES
Patient presents to [facility] with complaints of possible shingles outbreak that started 1.5 weeks ago. Patient states it started on her vagina. And spread to her buttocks. Patient also reports having it on her legs. Patient states she has been taking hydrocodone with little relief.       Shelby Wan RN  12/26/22 2975

## 2022-12-29 LAB
MISC #3 REFERENCE REPORT: NORMAL
MISC. #2 REFERENCE REPORT: NORMAL

## 2022-12-30 LAB — MISC. #1 REFERENCE GROUP TEST: NORMAL

## (undated) DEVICE — POSITIONER HD W8XH4XL8.5IN RASPBERRY FOAM SLT

## (undated) DEVICE — SYRINGE BULB 50/CS 48/PLT: Brand: MEDEGEN MEDICAL PRODUCTS, LLC

## (undated) DEVICE — GLOVE ORANGE PI 7   MSG9070

## (undated) DEVICE — CHLORAPREP 26ML ORANGE

## (undated) DEVICE — TELFA NON-ADHERENT ABSORBENT DRESSING: Brand: TELFA

## (undated) DEVICE — SUTURE VCRL + SZ 3-0 L27IN ABSRB UD L26MM SH 1/2 CIR VCP416H

## (undated) DEVICE — SUREFIT, DUAL DISPERSIVE ELECTRODE, CONTACT QUALITY MONITOR: Brand: SUREFIT

## (undated) DEVICE — GOWN,SIRUS,NON REINFRCD,LARGE,SET IN SL: Brand: MEDLINE

## (undated) DEVICE — PACK PROCEDURE SURG SET UP SRMC

## (undated) DEVICE — SUTURE VCRL + SZ 4-0 L27IN ABSRB WHT FS-2 3/8 CIR REV CUT VCP422H

## (undated) DEVICE — 3M™ TRANSPORE™ WHITE SURGICAL TAPE 1534-2, 2 INCH X 10 YARD (5CM X 9,1M), 6 ROLLS/CARTON 10 CARTONS/CASE: Brand: 3M™ TRANSPORE™

## (undated) DEVICE — BLADE CLIPPER GEN PURP NS

## (undated) DEVICE — DISCONTINUED USE 394504 SYRINGE LUER LOCK TIP 12 ML

## (undated) DEVICE — COVER ARMBRD W13XL28.5IN IMPERV BLU FOR OP RM

## (undated) DEVICE — AGENT HEMSTAT 3GM PURIFIED PLNT STARCH PWD ABSRB ARISTA AH

## (undated) DEVICE — CONMED ACCESSORY ELECTRODE, NEEDLE ELECTRODE: Brand: CONMED

## (undated) DEVICE — GAUZE,SPONGE,8"X4",12PLY,XRAY,STRL,LF: Brand: MEDLINE

## (undated) DEVICE — THERMOGARD PLUS ABC DUAL DISPERSIVE ELECTRODE: Brand: THERMOGARD

## (undated) DEVICE — 2000CC GUARDIAN II: Brand: GUARDIAN

## (undated) DEVICE — APPLIER LIG CLP M L11IN TI STR RNG HNDL FOR 20 CLP DISP

## (undated) DEVICE — GLOVE SURG SZ 65 THK91MIL LTX FREE SYN POLYISOPRENE

## (undated) DEVICE — HYPODERMIC SAFETY NEEDLE: Brand: MAGELLAN

## (undated) DEVICE — PENCIL SMK EVAC ALL IN 1 DSGN ENH VISIBILITY IMPROVED AIR

## (undated) DEVICE — GLOVE ORANGE PI 7 1/2   MSG9075

## (undated) DEVICE — BREAST HERNIA PACK: Brand: MEDLINE INDUSTRIES, INC.

## (undated) DEVICE — SOLUTION IV IRRIG POUR BRL 0.9% SODIUM CHL 2F7124

## (undated) DEVICE — GLOVE ORANGE PI 8   MSG9080

## (undated) DEVICE — TRAY PREP DRY W/ PREM GLV 2 APPL 6 SPNG 2 UNDPD 1 OVERWRAP